# Patient Record
Sex: FEMALE | Race: WHITE | Employment: FULL TIME
[De-identification: names, ages, dates, MRNs, and addresses within clinical notes are randomized per-mention and may not be internally consistent; named-entity substitution may affect disease eponyms.]

---

## 2017-10-22 ENCOUNTER — HEALTH MAINTENANCE LETTER (OUTPATIENT)
Age: 47
End: 2017-10-22

## 2018-02-01 LAB — PAP SMEAR - HIM PATIENT REPORTED: NEGATIVE

## 2018-02-20 ENCOUNTER — TRANSFERRED RECORDS (OUTPATIENT)
Dept: HEALTH INFORMATION MANAGEMENT | Facility: CLINIC | Age: 48
End: 2018-02-20

## 2019-03-19 ENCOUNTER — ANCILLARY PROCEDURE (OUTPATIENT)
Dept: GENERAL RADIOLOGY | Facility: CLINIC | Age: 49
End: 2019-03-19
Attending: PHYSICIAN ASSISTANT
Payer: COMMERCIAL

## 2019-03-19 ENCOUNTER — OFFICE VISIT (OUTPATIENT)
Dept: FAMILY MEDICINE | Facility: CLINIC | Age: 49
End: 2019-03-19
Payer: COMMERCIAL

## 2019-03-19 VITALS
BODY MASS INDEX: 41.38 KG/M2 | WEIGHT: 248.4 LBS | OXYGEN SATURATION: 97 % | HEIGHT: 65 IN | TEMPERATURE: 98.2 F | RESPIRATION RATE: 16 BRPM | SYSTOLIC BLOOD PRESSURE: 138 MMHG | DIASTOLIC BLOOD PRESSURE: 92 MMHG | HEART RATE: 94 BPM

## 2019-03-19 DIAGNOSIS — M77.9 BONE SPUR: ICD-10-CM

## 2019-03-19 DIAGNOSIS — R03.0 ELEVATED BLOOD PRESSURE READING WITHOUT DIAGNOSIS OF HYPERTENSION: ICD-10-CM

## 2019-03-19 DIAGNOSIS — M79.672 LEFT FOOT PAIN: Primary | ICD-10-CM

## 2019-03-19 DIAGNOSIS — M79.672 LEFT FOOT PAIN: ICD-10-CM

## 2019-03-19 PROCEDURE — 99213 OFFICE O/P EST LOW 20 MIN: CPT | Performed by: PHYSICIAN ASSISTANT

## 2019-03-19 PROCEDURE — 73630 X-RAY EXAM OF FOOT: CPT | Mod: LT

## 2019-03-19 ASSESSMENT — MIFFLIN-ST. JEOR: SCORE: 1754.68

## 2019-03-19 NOTE — PROGRESS NOTES
pod  SUBJECTIVE:   Claudia Heaton is a 48 year old female who presents to clinic today for the following health issues:    Musculoskeletal problem/pain    Duration: Saturday     Description  Location: left foot     Intensity:  Moderate to severe    Accompanying signs and symptoms: swelling    History  Previous similar problem: YES  Previous evaluation: xrays, pt has bone spurs      Precipitating or alleviating factors:  Trauma or overuse: no   Aggravating factors include: standing and walking    Therapies tried and outcome: ice and Aleve     -Patient is a 47yo female who presents with left foot pain, increased since Saturday  -She has a hx of bone spurs, arthritis - dx with podiatry/imaging  -at that point she chose conservative management with nsaids, supportive footwear  -for her work she spends a lot of time on her feet and noting the longer she is on her feet the worse symptoms are getting  -prior to this flare there was NO acute injury    Problem list and histories reviewed & adjusted, as indicated.  Additional history: as documented    Patient Active Problem List   Diagnosis     Contraception, device intrauterine     Hashimoto's thyroiditis     Obesity     CARDIOVASCULAR SCREENING; LDL GOAL LESS THAN 160     Past Surgical History:   Procedure Laterality Date     C  DELIVERY ONLY         Social History     Tobacco Use     Smoking status: Never Smoker     Smokeless tobacco: Never Used   Substance Use Topics     Alcohol use: Yes     Comment: Rare     Family History   Problem Relation Age of Onset     Thyroid Disease Father      Breast Cancer Maternal Grandmother      Cancer Maternal Grandmother         Skin     Cancer Maternal Grandfather         Unsure. Prostate?     Cardiovascular Paternal Grandfather            Reviewed and updated as needed this visit by clinical staff       Reviewed and updated as needed this visit by Provider         ROS:  Constitutional, HEENT, cardiovascular, pulmonary, gi and gu  "systems are negative, except as otherwise noted.    OBJECTIVE:     BP (!) 138/92   Pulse 94   Temp 98.2  F (36.8  C) (Tympanic)   Resp 16   Ht 1.638 m (5' 4.5\")   Wt 112.7 kg (248 lb 6.4 oz)   SpO2 97%   BMI 41.98 kg/m    Body mass index is 41.98 kg/m .  GENERAL: healthy, alert and no distress  MS: no erythema, warmth or gross swelling. The left foot shows obvious spurring/mass along the dorsal aspect of the mid foot. She has increased pain with toe flexion and extension. There is ttp over the tmt of the great toe. No obvious other abnormalities    Diagnostic Test Results:  Xray - No acute fracture seen; await radiology     ASSESSMENT/PLAN:   1. Left foot pain  2. Bone spur  Chronic hx of pain with increase since Saturday. I am doubtful for stress fracture, her biggest concern. Still with known complications even 10 years ago and obvious increase in the spurring, will have her evaluated by podiatry  - XR Foot Left G/E 3 Views; Future  - ORTHO  REFERRAL    3. Elevated blood pressure reading without diagnosis of hypertension  Recommend close follow up for this. She believes this is likely temporary 2/2 pain but will monitor      Duane Marquez PA-C  [facility]  "

## 2019-03-20 ENCOUNTER — OFFICE VISIT (OUTPATIENT)
Dept: PODIATRY | Facility: CLINIC | Age: 49
End: 2019-03-20
Payer: COMMERCIAL

## 2019-03-20 VITALS
BODY MASS INDEX: 41.32 KG/M2 | WEIGHT: 248 LBS | HEIGHT: 65 IN | DIASTOLIC BLOOD PRESSURE: 72 MMHG | SYSTOLIC BLOOD PRESSURE: 126 MMHG

## 2019-03-20 DIAGNOSIS — M79.672 LEFT FOOT PAIN: Primary | ICD-10-CM

## 2019-03-20 DIAGNOSIS — M77.52 BONE SPUR OF LEFT FOOT: ICD-10-CM

## 2019-03-20 DIAGNOSIS — E66.01 MORBID OBESITY (H): ICD-10-CM

## 2019-03-20 DIAGNOSIS — M19.072 OSTEOARTHRITIS OF LEFT FOOT, UNSPECIFIED OSTEOARTHRITIS TYPE: ICD-10-CM

## 2019-03-20 PROCEDURE — 99204 OFFICE O/P NEW MOD 45 MIN: CPT | Performed by: PODIATRIST

## 2019-03-20 RX ORDER — DEXAMETHASONE SODIUM PHOSPHATE 4 MG/ML
4 INJECTION, SOLUTION INTRA-ARTICULAR; INTRALESIONAL; INTRAMUSCULAR; INTRAVENOUS; SOFT TISSUE ONCE
Qty: 30 ML | Refills: 0 | Status: SHIPPED | OUTPATIENT
Start: 2019-03-20 | End: 2019-07-18

## 2019-03-20 ASSESSMENT — MIFFLIN-ST. JEOR: SCORE: 1752.86

## 2019-03-20 NOTE — LETTER
3/20/2019         RE: Claudia Heaton  [address]  [facility] [address]        Dear Colleague,    Thank you for referring your patient, Claudia Heaton, to the [facility]. Please see a copy of my visit note below.    PATIENT HISTORY:  Sky Marquez PA-C requested I see this patient for their foot issue.  Claudia Heaton is a 48 year old female who presents to clinic for pain to the left dorsal foot. Has had for years. Was diagnosed with arthritis 10 years ago. Has been managing it conservatively. Last few weeks has been more painful. Fairly active at her job and works out. Pain is 6/10 but can be 10/10 at its worst. Has tried icing, heat, new shoes, inserts, and nsaids. Did have xrays yesterday. Wondering what can be done for it at this time.     Review of Systems:  Patient denies fever, chills, rash, wound, numbness, weakness, heart burn, blood in stool, chest pain with activity, calf pain when walking, shortness of breath with activity, chronic cough, easy bleeding/bruising, swelling of ankles, excessive thirst, fatigue, depression, anxiety.  Patient admits to stiffness, limping at times.     PAST MEDICAL HISTORY: No past medical history on file.     PAST SURGICAL HISTORY:   Past Surgical History:   Procedure Laterality Date     C  DELIVERY ONLY          MEDICATIONS:   Current Outpatient Medications:      SYNTHROID 88 MCG OR TABS, 1 TABLET DAILY, Disp: , Rfl:      VITAMIN D CAPS 80494 U OR, 1 CAPSULE DAILY, Disp: , Rfl:      dicyclomine (BENTYL) 20 MG tablet, Take 1 tablet by mouth 4 times daily as needed. (Patient not taking: Reported on 3/19/2019), Disp: 100 tablet, Rfl: 3     ALLERGIES:  No Known Allergies     SOCIAL HISTORY:   Social History     Socioeconomic History     Marital status: Single     Spouse name: Not on file     Number of children: Not on file     Years of education: Not on file     Highest education level: Not on file   Occupational History     Not on file  "  Social Needs     Financial resource strain: Not on file     Food insecurity:     Worry: Not on file     Inability: Not on file     Transportation needs:     Medical: Not on file     Non-medical: Not on file   Tobacco Use     Smoking status: Never Smoker     Smokeless tobacco: Never Used   Substance and Sexual Activity     Alcohol use: Yes     Comment: Rare     Drug use: No     Sexual activity: Not Currently   Lifestyle     Physical activity:     Days per week: Not on file     Minutes per session: Not on file     Stress: Not on file   Relationships     Social connections:     Talks on phone: Not on file     Gets together: Not on file     Attends [employer] service: Not on file     Active member of club or organization: Not on file     Attends meetings of clubs or organizations: Not on file     Relationship status: Not on file     Intimate partner violence:     Fear of current or ex partner: Not on file     Emotionally abused: Not on file     Physically abused: Not on file     Forced sexual activity: Not on file   Other Topics Concern     Not on file   Social History Narrative     Not on file        FAMILY HISTORY:   Family History   Problem Relation Age of Onset     Thyroid Disease Father      Breast Cancer Maternal Grandmother      Cancer Maternal Grandmother         Skin     Cancer Maternal Grandfather         Unsure. Prostate?     Cardiovascular Paternal Grandfather         EXAM:Vitals:  /72   Ht 1.638 m (5' 4.5\")   Wt 112.5 kg (248 lb)   BMI 41.91 kg/m       General appearance: Patient is alert and fully cooperative with history & exam.  No sign of distress is noted during the visit.     Psychiatric: Affect is pleasant & appropriate.  Patient appears motivated to improve health.     Respiratory: Breathing is regular & unlabored while sitting.     HEENT: Hearing is intact to spoken word.  Speech is clear.  No gross evidence of visual impairment that would impact ambulation.     Dermatologic: Skin is " intact to both lower extremities without significant lesions, rash or abrasion.  No paronychia or evidence of soft tissue infection is noted.     Vascular: DP & PT pulses are intact & regular bilaterally.  No significant edema or varicosities noted.  CFT and skin temperature is normal to both lower extremities.     Neurologic: Lower extremity sensation is intact to light touch.  No evidence of weakness or contracture in the lower extremities.  No evidence of neuropathy.     Musculoskeletal: Patient is ambulatory without assistive device or brace. decrease arch height. Pain with range of motion of midtarsal joint, specifically 1st and 2nd metatarsal cuneiforms.     Radiographs: left foot xray: (non weight bearing) No fractures are seen in the LEFT foot. Mdifoot spurring and degenerative changes. Small plantar calcaneal spur. No erosions.     ASSESSMENT:    Left foot pain  Morbid obesity (H)  Osteoarthritis of left foot, unspecified osteoarthritis type  Bone spur of left foot     PLAN:  Reviewed patient's chart in epic. Reviewed xrays. Talked about arthritis and treatments including orthotics, injections, icing, NSAIDS, bracing, physical therapy, compounding pain cream, or surgical intervention.    Appears to be an old lisfranc type injury. Recommend fusion of the 1st and 2nd metatarsal cuneiform joints. Talked about risks including infection, numbness, continued pain, non union, need for further surgery, blood loss, blood clotting. You will scar. She would be non weight bearing for 6 weeks and then minimal weight bearing in boot for 1 month.     She is not interested in surgery at this time. Will try PT with ionto, topical pain cream, and was given a boot for when foot is very sore. She will call when she would like to proceed with surgery.        Bia Medellin DPM, Podiatry/Foot and Ankle Surgery    Weight management plan: Patient was referred to their PCP to discuss a diet and exercise plan.      Again, thank you  for allowing me to participate in the care of your patient.        Sincerely,        Bia Medellin DPM, Podiatry/Foot and Ankle Surgery

## 2019-03-20 NOTE — PROGRESS NOTES
PATIENT HISTORY:  Sky Marquez PA-C requested I see this patient for their foot issue.  Claudia Heaton is a 48 year old female who presents to clinic for pain to the left dorsal foot. Has had for years. Was diagnosed with arthritis 10 years ago. Has been managing it conservatively. Last few weeks has been more painful. Fairly active at her job and works out. Pain is 6/10 but can be 10/10 at its worst. Has tried icing, heat, new shoes, inserts, and nsaids. Did have xrays yesterday. Wondering what can be done for it at this time.     Review of Systems:  Patient denies fever, chills, rash, wound, numbness, weakness, heart burn, blood in stool, chest pain with activity, calf pain when walking, shortness of breath with activity, chronic cough, easy bleeding/bruising, swelling of ankles, excessive thirst, fatigue, depression, anxiety.  Patient admits to stiffness, limping at times.     PAST MEDICAL HISTORY: No past medical history on file.     PAST SURGICAL HISTORY:   Past Surgical History:   Procedure Laterality Date     C  DELIVERY ONLY          MEDICATIONS:   Current Outpatient Medications:      SYNTHROID 88 MCG OR TABS, 1 TABLET DAILY, Disp: , Rfl:      VITAMIN D CAPS 65854 U OR, 1 CAPSULE DAILY, Disp: , Rfl:      dicyclomine (BENTYL) 20 MG tablet, Take 1 tablet by mouth 4 times daily as needed. (Patient not taking: Reported on 3/19/2019), Disp: 100 tablet, Rfl: 3     ALLERGIES:  No Known Allergies     SOCIAL HISTORY:   Social History     Socioeconomic History     Marital status: Single     Spouse name: Not on file     Number of children: Not on file     Years of education: Not on file     Highest education level: Not on file   Occupational History     Not on file   Social Needs     Financial resource strain: Not on file     Food insecurity:     Worry: Not on file     Inability: Not on file     Transportation needs:     Medical: Not on file     Non-medical: Not on file   Tobacco Use     Smoking status: Never  "Smoker     Smokeless tobacco: Never Used   Substance and Sexual Activity     Alcohol use: Yes     Comment: Rare     Drug use: No     Sexual activity: Not Currently   Lifestyle     Physical activity:     Days per week: Not on file     Minutes per session: Not on file     Stress: Not on file   Relationships     Social connections:     Talks on phone: Not on file     Gets together: Not on file     Attends [employer] service: Not on file     Active member of club or organization: Not on file     Attends meetings of clubs or organizations: Not on file     Relationship status: Not on file     Intimate partner violence:     Fear of current or ex partner: Not on file     Emotionally abused: Not on file     Physically abused: Not on file     Forced sexual activity: Not on file   Other Topics Concern     Not on file   Social History Narrative     Not on file        FAMILY HISTORY:   Family History   Problem Relation Age of Onset     Thyroid Disease Father      Breast Cancer Maternal Grandmother      Cancer Maternal Grandmother         Skin     Cancer Maternal Grandfather         Unsure. Prostate?     Cardiovascular Paternal Grandfather         EXAM:Vitals:  /72   Ht 1.638 m (5' 4.5\")   Wt 112.5 kg (248 lb)   BMI 41.91 kg/m      General appearance: Patient is alert and fully cooperative with history & exam.  No sign of distress is noted during the visit.     Psychiatric: Affect is pleasant & appropriate.  Patient appears motivated to improve health.     Respiratory: Breathing is regular & unlabored while sitting.     HEENT: Hearing is intact to spoken word.  Speech is clear.  No gross evidence of visual impairment that would impact ambulation.     Dermatologic: Skin is intact to both lower extremities without significant lesions, rash or abrasion.  No paronychia or evidence of soft tissue infection is noted.     Vascular: DP & PT pulses are intact & regular bilaterally.  No significant edema or varicosities noted.  CFT " and skin temperature is normal to both lower extremities.     Neurologic: Lower extremity sensation is intact to light touch.  No evidence of weakness or contracture in the lower extremities.  No evidence of neuropathy.     Musculoskeletal: Patient is ambulatory without assistive device or brace. decrease arch height. Pain with range of motion of midtarsal joint, specifically 1st and 2nd metatarsal cuneiforms.     Radiographs: left foot xray: (non weight bearing) No fractures are seen in the LEFT foot. Mdifoot spurring and degenerative changes. Small plantar calcaneal spur. No erosions.     ASSESSMENT:    Left foot pain  Morbid obesity (H)  Osteoarthritis of left foot, unspecified osteoarthritis type  Bone spur of left foot     PLAN:  Reviewed patient's chart in epic. Reviewed xrays. Talked about arthritis and treatments including orthotics, injections, icing, NSAIDS, bracing, physical therapy, compounding pain cream, or surgical intervention.    Appears to be an old lisfranc type injury. Recommend fusion of the 1st and 2nd metatarsal cuneiform joints. Talked about risks including infection, numbness, continued pain, non union, need for further surgery, blood loss, blood clotting. You will scar. She would be non weight bearing for 6 weeks and then minimal weight bearing in boot for 1 month.     She is not interested in surgery at this time. Will try PT with ionto, topical pain cream, and was given a boot for when foot is very sore. She will call when she would like to proceed with surgery.        Bia Medellin DPM, Podiatry/Foot and Ankle Surgery    Weight management plan: Patient was referred to their PCP to discuss a diet and exercise plan.

## 2019-03-20 NOTE — PATIENT INSTRUCTIONS
Thank you for choosing [facility]!    DR. GOMEZ'S CLINIC SCHEDULE  MONDAY AM - [place] TUESDAY - [place]   [address] [address] [address] [address] [place] [address]   [phone] / [phone] / [phone]       WEDNESDAY - [place] FRIDAY AM - [facility]   [address]   [address] [address] [address] [place] [address] [address]   [phone] / [phone]       FRIDAY PM - [place] SCHEDULE SURGERY: [phone]   [address] BILLING QUESTIONS: [phone]   [place] [address] AFTER HOURS: [phone]   [phone] / [phone] APPOINTMENTS: [phone]     Consumer Price Line (CPL) [phone]       OSTEOARTHRITIS OF THE FOOT & ANKLE  Osteoarthritis is a condition characterized by the breakdown and eventual loss of cartilage in one or more joints. Cartilage (the connective tissue found at the end of the bones in the joints) protects and cushions the bones during movement. When cartilage deteriorates or is lost, symptoms develop that can restrict one s ability to easily perform daily activities.  Osteoarthritis is also known as degenerative arthritis, reflecting its nature to develop as part of the aging process. As the most common form of arthritis, osteoarthritis affects millions of Americans. Some people refer to osteoarthritis simply as arthritis, even though there are many different types of arthritis.  Osteoarthritis appears at various joints throughout the body, including the hands, feet, spine, hips, and knees. In the foot, the disease most frequently occurs in the big toe, although it is also often found in the midfoot and ankle.  CAUSES  Osteoarthritis is considered a  wear and tear  disease because the cartilage in the joint wears down with repeated stress and use over time. As the cartilage deteriorates and gets thinner, the bones lose their protective covering and  eventually may rub together, causing pain and inflammation of the joint.  An injury may also lead to osteoarthritis, although it may take months or years after the injury for the condition to develop. For example, osteoarthritis in the big toe is often caused by kicking or jamming the toe, or by dropping something on the toe. Osteoarthritis in the midfoot is often caused by dropping something on it, or by a sprain or fracture. In the ankle, osteoarthritis is usually caused by a fracture and occasionally by a severe sprain.  Sometimes osteoarthritis develops as a result of abnormal foot mechanics such as flat feet or high arches. A flat foot causes less stability in the ligaments (bands of tissue that connect bones), resulting in excessive strain on the joints, which can cause arthritis. A high arch is rigid and lacks mobility, causing a jamming of joints that creates an increased risk of arthritis.  SYMPTOMS  People with osteoarthritis in the foot or ankle experience, in varying degrees, one or more of the following: Pain and stiffness in the joint, swelling in or near the joint, or difficulty walking or bending the joint.   Some patients with osteoarthritis also develop a bone spur (a bony protrusion) at the affected joint. Shoe pressure may cause pain at the site of a bone spur, and in some cases blisters or calluses may form over its surface. Bone spurs can also limit the movement of the joint.    DIAGNOSIS  In diagnosing osteoarthritis, the foot and ankle surgeon will examine the foot thoroughly, looking for swelling in the joint, limited mobility, and pain with movement. In some cases, deformity and/or enlargement (spur) of the joint may be noted. X-rays may be ordered to evaluate the extent of the disease.  NON-SURGICAL TREATMENT  To help relieve symptoms, the surgeon may begin treating osteoarthritis with one or more of the following non-surgical approaches:  Oral medications. Nonsteroidal anti-inflammatory  drugs (NSAIDs), such as ibuprofen, are often helpful in reducing the inflammation and pain. Occasionally a prescription for a steroid medication is needed to adequately reduce symptoms.   Orthotic devices. Custom orthotic devices (shoe inserts) are often prescribed to provide support to improve the foot s mechanics or cushioning to help minimize pain.   Bracing. Bracing, which restricts motion and supports the joint, can reduce pain during walking and help prevent further deformity.   Immobilization. Protecting the foot from movement by wearing a cast or removable cast-boot may be necessary to allow the inflammation to resolve.   Steroid injections. In some cases, steroid injections are applied to the affected joint to deliver anti-inflammatory medication.   Physical therapy. Exercises to strengthen the muscles, especially when the osteoarthritis occurs in the ankle, may give the patient greater stability and help avoid injury that might worsen the condition.   DO I NEED SURGERY?  When osteoarthritis has progressed substantially or failed to improve with non-surgical treatment, surgery may be recommended. In advanced cases, surgery may be the only option. The goal of surgery is to decrease pain and improve function. The foot and ankle surgeon will consider a number of factors when selecting the procedure best suited to the patient s condition and lifestyle.    JOINT FUSION COMPLICATIONS   The intent of joint fusion is deformity correction, stability and relief of arthritic pain or bone spurs. Joints, however, do not always fuse. Research shows a 90% chance of bone healing in a nonsmoker and an 80% chance of bone healing in a smoker. The biggest cause of poor healing is stress on the surgical site. Successful healing will require ten or more weeks of protection. The first six weeks will require strict nonweightbearing with virtually no strain or pressure on the foot (some minor toe fusions allow weight on the foot).  This time frame involves casting and crutches.     External pins might be used. These pins will need to be covered and kept dry until removal in about six weeks. Premature pin loosening or removal could lead to poor bone healing. Infection around the pin is possible and therefore strict attention to protection and cleanliness is critical. Pin site infection will lead to significant complications   Poor healing fusions may require electronic bone stimulation or repeat surgery involving   bone grafting. Bone stimulators are expensive and are only used in situations where   bone healing is proven to be delayed. These are not always covered by insurance.     Joint fusions frequently require bone grafting. Bone might be needed from other parts of   your foot, leg or hip. The bone graft harvest site could present its own problems with   fracture, pain, infection and numbness. An alternative is freeze-dried bone from a bone   bank or synthetic bone material.     Successful healing is largely in the hands of the surgical patient. Any compromise to the   surgical site from cheating, injury, smoking or infection will lead to problems.     POTENTIAL COMPLICATIONS OF FOOT & ANKLE SURGERY  Undergoing a surgical procedure involves a certain amount of risk. Risks of complications vary depending on the complexity of the surgery and how you take care of the surgical area during the healing process. Complications can range from minor infection to death. Some complications are temporary while others will be permanent.  Your surgeon weighs the risk of complications vs the potential benefit of undergoing surgery. You need to consider your tolerance for unexpected problems as you decide whether to undergo surgery.    Foot and Ankle surgery involves cutting skin, bone, ligaments, blood vessels and joints.  These structures heal well but not without consequence. Any break to the skin can lead to infection. A deep infection involves bones  or joints which can be devastating. Deep infection can lead to amputation or could spread to other parts of your body. Most infections are minor and easily treated with oral antibiotics. Infections are often times from bacteria already present on your skin. Proper care of the surgical site is an essential component of avoiding infection. Do not get the bandage wet and take proper care of external pins to avoid these problems.     Joint stiffness is inherent to any foot or ankle surgery. Joint surgery is a major component of reconstructive foot and ankle procedures. The ligaments and tissues around the joint are cut, and later repaired. Scare tissue limits joint mobility. This can be permanent but generally improves over the course of one year.    Surgery involves dissection around nerves. Visible nerves are moved out of the way while very small nerves are cut. Scar tissue develops around nerves and can lead to nerve pain, numbness, or neuromas. Nerve symptoms can be permanent. This can lead to numbness or sometimes hypersensitivity to touch and problems wearing shoes.    Bones do not always heal after surgery. Poor healing after a bone cut or joint fusion can lead to an extended period of casting or repeat surgery. Electronic bone stimulators are sometimes used to stimulate poor healing of bone. Nonunion is when joint fusion does not take.  This can occur as often as 10% of the time. Smoking doubles your risk of poor bone healing to 20%.    Bone grafting is sometimes necessary during the original or subsequent surgery. Bone is sometimes taken from other parts of your body or freeze dried bone from a bone bank from a bone bank or synthetic bone material might be used.    A scar is always present after foot and ankle surgery. The scar will be visible and could be sensitive. Some people develop excessive scarring, which cannot be controlled by the surgeon. Scars can be unsightly and can restrict joint  mobility.    Blood clots can develop in the calf after surgery. Foot and ankle surgery is a predisposing factor for blood clots. The blood clot could break and travel to your lung.  This condition can lead to death. Early warning signs could include calf swelling and pain, chest pain or shortness of breath. This is an emergency that requires immediate attention by a medical doctor!    Surgery will not necessarily create a pain-free foot. Even normal feet hurt. Crooked toes, bunions, neuromas, flat feet and arthritis should all be considered permanent conditions.  Ankle pain commonly requires multiple surgeries over a lifetime. Do not assume that having surgery will permanently fix your condition. You may need permanent alteration in shoes and activities to accommodate your foot and ankle problem.    Careful attention to post-operative recommendations will dramatically reduce your risk of complications. Proper dressing, wound care, elevation and rest will be essential to get the wound healed and minimize scarring. Strict attention to activity restrictions, such as non-weight bearing, or partial weight bearing is essential. Internal fixation devices may not resist the stress of walking. Some select surgeries allow the patient to walk, however this should be very minimal.    Despite these concerns, foot and ankle surgery leads to a high level of patient satisfaction. Your surgeon would not recommend surgery if he/she did not expect your foot to improve. Talk to your surgeon about any of the above issues.      BODY WEIGHT AND YOUR FEET  The following information is included in the after visit summary for all patients. Body weight can be a sensitive issue to discuss in clinic, but we think the following information is very important. Although we focus on the feet and ankles, we do support the overall health of our patients.     Many things can cause foot and ankle problems. Foot structure, activity level, foot mechanics  and injuries are common causes of pain. One very important issue that often goes unmentioned, is body weight. Extra weight can cause increased stress on muscles, ligaments, bones and tendons. Sometimes just a few extra pounds is all it takes to put one over her/his threshold. Without reducing that stress, it can be difficult to alleviate pain. As Foot & Ankle specialists, our job is addressing the lower extremity problem and possible causes. Regarding extra body weight, we encourage patients to discuss diet and weight management plans with their primary care doctors. It is this team approach that gives you the best opportunity for pain relief and getting you back on your feet.      [facility] has a Comprehensive Weight Management Program. This program includes counseling, education, non-surgical and surgical approaches to weight loss. If you are interested in learning more either talk to you primary care provider or call [phone].

## 2019-04-01 ENCOUNTER — THERAPY VISIT (OUTPATIENT)
Dept: PHYSICAL THERAPY | Facility: CLINIC | Age: 49
End: 2019-04-01
Payer: COMMERCIAL

## 2019-04-01 DIAGNOSIS — M19.072 OSTEOARTHRITIS OF LEFT FOOT, UNSPECIFIED OSTEOARTHRITIS TYPE: ICD-10-CM

## 2019-04-01 DIAGNOSIS — E66.01 MORBID OBESITY (H): ICD-10-CM

## 2019-04-01 DIAGNOSIS — M77.52 BONE SPUR OF LEFT FOOT: ICD-10-CM

## 2019-04-01 DIAGNOSIS — M79.672 LEFT FOOT PAIN: ICD-10-CM

## 2019-04-01 PROCEDURE — 97161 PT EVAL LOW COMPLEX 20 MIN: CPT | Mod: GP | Performed by: PHYSICAL THERAPIST

## 2019-04-01 PROCEDURE — 97110 THERAPEUTIC EXERCISES: CPT | Mod: GP | Performed by: PHYSICAL THERAPIST

## 2019-04-01 PROCEDURE — 97033 APP MDLTY 1+IONTPHRSIS EA 15: CPT | Mod: GP | Performed by: PHYSICAL THERAPIST

## 2019-04-01 NOTE — PROGRESS NOTES
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  She goes to [employer] - used to go 4 days/week.  Pt was living with a 3-5/10 pain up until 2 weeks ago.  She was on her feet a lot on concrete for a recruiting event and she was standing a lot.  She put weight through her heel a lot and she moved her weight forward to the front of the foot and got an 11+/10 pain.  The pain didn't settle down much, so she went in to see the doctor.  Pain when she is walking the boot is 2-3/10.      The history is provided by the patient. No  was used.   Claudia Heaton is a 48 year old female with a left foot condition.  Condition occurred with:  Degenerative joint disease.  Condition occurred: other.  This is a chronic condition  Worsened on 3/15/2019.    Site of Pain: middle of dorsum of foot.  Radiates to:  Foot.  Pain is described as sharp and is intermittent and reported as 7/10.  Associated symptoms:  Loss of strength and loss of motion/stiffness. Worse during: weightbearing activities.  Symptoms are exacerbated by weight bearing and relieved by bracing/immobilizing.  Since onset symptoms are gradually worsening.  Special tests:  X-ray (arthritis).      General health as reported by patient is good.  Pertinent medical history includes:  Overweight and thyroid problems (arthritis in foot).  Medical allergies: no.  Other surgeries include:  Other ().  Current medications:  Thyroid medication and pain medication (aleve).  Current occupation is  for [employer].    Primary job tasks include:  Driving, lifting, prolonged standing and prolonged sitting (computer work).    Barriers include:  None as reported by patient.    Red flags:  None as reported by patient.                        Objective:    Gait:  In a boot  Gait Type:  Antalgic                Ankle/Foot Evaluation  ROM:    AROM:    Dorsiflexion:  Left:   12  Right:   20  Plantarflexion:  Left:  48 (PDM)    Right:  60  Inversion:  Left:  24     Right:  15  Eversion:  4 (+++)     Right:  35        Strength:    Dorsiflexion:  Left: 5/5      Pain:+       Inversion:Left: 5/5   Pain:++       Eversion:Left: 5/5   Pain:++                Strength wnl ankle: unable to assess PF strength secondary to pain.                                                              General     ROS    Assessment/Plan:    Patient is a 48 year old female with L foot complaints.    Patient has the following significant findings with corresponding treatment plan.                Diagnosis 1:  L foot pain secondary to arthritis  Pain -  hot/cold therapy  Decreased ROM/flexibility - therapeutic exercise and home program  Inflammation - iontophoresis  Decreased function - therapeutic activities and home program    Cumulative Therapy Evaluation is: Low complexity.    Previous and current functional limitations:  (See Goal Flow Sheet for this information)    Short term and Long term goals: (See Goal Flow Sheet for this information)     Communication ability:  Patient appears to be able to clearly communicate and understand verbal and written communication and follow directions correctly.  Treatment Explanation - The following has been discussed with the patient:   RX ordered/plan of care  Anticipated outcomes  Possible risks and side effects  This patient would benefit from PT intervention to resume normal activities.   Rehab potential is good.    Frequency:  1 X week, once daily  Duration:  for 6 weeks  Discharge Plan:  Achieve all LTG.  Independent in home treatment program.  Reach maximal therapeutic benefit.    Please refer to the daily flowsheet for treatment today, total treatment time and time spent performing 1:1 timed codes.

## 2019-04-03 ENCOUNTER — OFFICE VISIT (OUTPATIENT)
Dept: PODIATRY | Facility: CLINIC | Age: 49
End: 2019-04-03
Payer: COMMERCIAL

## 2019-04-03 VITALS
BODY MASS INDEX: 41.32 KG/M2 | DIASTOLIC BLOOD PRESSURE: 68 MMHG | HEIGHT: 65 IN | WEIGHT: 248 LBS | SYSTOLIC BLOOD PRESSURE: 126 MMHG

## 2019-04-03 DIAGNOSIS — M19.072 OSTEOARTHRITIS OF LEFT FOOT, UNSPECIFIED OSTEOARTHRITIS TYPE: ICD-10-CM

## 2019-04-03 DIAGNOSIS — M79.672 LEFT FOOT PAIN: Primary | ICD-10-CM

## 2019-04-03 PROCEDURE — 99213 OFFICE O/P EST LOW 20 MIN: CPT | Performed by: PODIATRIST

## 2019-04-03 ASSESSMENT — MIFFLIN-ST. JEOR: SCORE: 1752.86

## 2019-04-03 NOTE — LETTER
"    4/3/2019         RE: Claudia Heaton  [address]  [facility] [address]        Dear Colleague,    Thank you for referring your patient, Claudia Heaton, to the [facility]. Please see a copy of my visit note below.    Podiatry / Foot and Ankle Surgery Progress Note    April 3, 2019    Subject: Patient was seen for follow up on left foot pain. Notes the boot has been helpful. Has just had one session of PT.     Objective:  Vitals: /68   Ht 1.638 m (5' 4.5\")   Wt 112.5 kg (248 lb)   BMI 41.91 kg/m     BMI= Body mass index is 41.91 kg/m .     General: NAD    Dermatologic: Skin is intact to both lower extremities without significant lesions, rash or abrasion.  No paronychia or evidence of soft tissue infection is noted.     Vascular: DP & PT pulses are intact & regular bilaterally.  No significant edema or varicosities noted.  CFT and skin temperature is normal to both lower extremities.     Neurologic: Lower extremity sensation is intact to light touch.  No evidence of weakness or contracture in the lower extremities.  No evidence of neuropathy.     Musculoskeletal: Patient is ambulatory without assistive device or brace. decrease arch height. Pain with range of motion of midtarsal joint, specifically 1st and 2nd metatarsal cuneiforms.      Radiographs: left foot xray: (non weight bearing) No fractures are seen in the LEFT foot. Mdifoot spurring and degenerative changes. Small plantar calcaneal spur. No erosions.     ASSESSMENT:    Left foot pain  Morbid obesity (H)  Osteoarthritis of left foot, unspecified osteoarthritis type  Bone spur of left foot     PLAN:  Reviewed patient's chart in epic. Reviewed xrays. Talked about arthritis and treatments including orthotics, injections, icing, NSAIDS, bracing, physical therapy, compounding pain cream, or surgical intervention.     Appears to be an old lisfranc type injury. Recommend fusion of the 1st and 2nd metatarsal cuneiform joints. " Talked about risks including infection, numbness, continued pain, non union, need for further surgery, blood loss, blood clotting. You will scar. She would be non weight bearing for 6 weeks and then minimal weight bearing in boot for 1 month.      She is not interested in surgery at this time. Will try PT with ionto, topical pain cream, and was given a boot for when foot is very sore. She will call when she would like to proceed with surgery.     Bia Medellin DPM, Podiatry/Foot and Ankle Surgery    Weight management plan: Patient was referred to their PCP to discuss a diet and exercise plan.    Recommended to Claudia Heaton to follow up with Primary Care provider regarding elevated blood pressure.      Again, thank you for allowing me to participate in the care of your patient.        Sincerely,        Bia Medellin DPM, Podiatry/Foot and Ankle Surgery

## 2019-04-03 NOTE — PROGRESS NOTES
"Podiatry / Foot and Ankle Surgery Progress Note    April 3, 2019    Subject: Patient was seen for follow up on left foot pain. Notes the boot has been helpful. Has just had one session of PT.     Objective:  Vitals: /68   Ht 1.638 m (5' 4.5\")   Wt 112.5 kg (248 lb)   BMI 41.91 kg/m    BMI= Body mass index is 41.91 kg/m .     General: NAD    Dermatologic: Skin is intact to both lower extremities without significant lesions, rash or abrasion.  No paronychia or evidence of soft tissue infection is noted.     Vascular: DP & PT pulses are intact & regular bilaterally.  No significant edema or varicosities noted.  CFT and skin temperature is normal to both lower extremities.     Neurologic: Lower extremity sensation is intact to light touch.  No evidence of weakness or contracture in the lower extremities.  No evidence of neuropathy.     Musculoskeletal: Patient is ambulatory without assistive device or brace. decrease arch height. Pain with range of motion of midtarsal joint, specifically 1st and 2nd metatarsal cuneiforms.      Radiographs: left foot xray: (non weight bearing) No fractures are seen in the LEFT foot. Mdifoot spurring and degenerative changes. Small plantar calcaneal spur. No erosions.     ASSESSMENT:    Left foot pain  Morbid obesity (H)  Osteoarthritis of left foot, unspecified osteoarthritis type  Bone spur of left foot     PLAN:  Reviewed patient's chart in epic. Reviewed xrays. Talked about arthritis and treatments including orthotics, injections, icing, NSAIDS, bracing, physical therapy, compounding pain cream, or surgical intervention.     Appears to be an old lisfranc type injury. Recommend fusion of the 1st and 2nd metatarsal cuneiform joints. Talked about risks including infection, numbness, continued pain, non union, need for further surgery, blood loss, blood clotting. You will scar. She would be non weight bearing for 6 weeks and then minimal weight bearing in boot for 1 month. "      She is not interested in surgery at this time. Will try PT with ionto, topical pain cream, and was given a boot for when foot is very sore. She will call when she would like to proceed with surgery.     Bia Medellin DPM, Podiatry/Foot and Ankle Surgery    Weight management plan: Patient was referred to their PCP to discuss a diet and exercise plan.    Recommended to Claudia Heaton to follow up with Primary Care provider regarding elevated blood pressure.

## 2019-04-08 ENCOUNTER — THERAPY VISIT (OUTPATIENT)
Dept: PHYSICAL THERAPY | Facility: CLINIC | Age: 49
End: 2019-04-08
Payer: COMMERCIAL

## 2019-04-08 DIAGNOSIS — M79.672 LEFT FOOT PAIN: ICD-10-CM

## 2019-04-08 PROCEDURE — 97110 THERAPEUTIC EXERCISES: CPT | Mod: GP | Performed by: PHYSICAL THERAPIST

## 2019-04-08 PROCEDURE — 97035 APP MDLTY 1+ULTRASOUND EA 15: CPT | Mod: GP | Performed by: PHYSICAL THERAPIST

## 2019-04-16 ENCOUNTER — THERAPY VISIT (OUTPATIENT)
Dept: PHYSICAL THERAPY | Facility: CLINIC | Age: 49
End: 2019-04-16
Payer: COMMERCIAL

## 2019-04-16 DIAGNOSIS — M79.672 LEFT FOOT PAIN: ICD-10-CM

## 2019-04-16 PROCEDURE — 97035 APP MDLTY 1+ULTRASOUND EA 15: CPT | Mod: GP | Performed by: PHYSICAL THERAPIST

## 2019-06-26 ENCOUNTER — OFFICE VISIT (OUTPATIENT)
Dept: PODIATRY | Facility: CLINIC | Age: 49
End: 2019-06-26
Payer: COMMERCIAL

## 2019-06-26 VITALS
WEIGHT: 246 LBS | DIASTOLIC BLOOD PRESSURE: 90 MMHG | HEIGHT: 65 IN | SYSTOLIC BLOOD PRESSURE: 136 MMHG | BODY MASS INDEX: 40.98 KG/M2

## 2019-06-26 DIAGNOSIS — M77.52 BONE SPUR OF LEFT FOOT: ICD-10-CM

## 2019-06-26 DIAGNOSIS — M79.672 LEFT FOOT PAIN: Primary | ICD-10-CM

## 2019-06-26 DIAGNOSIS — M19.072 PRIMARY OSTEOARTHRITIS OF LEFT FOOT: ICD-10-CM

## 2019-06-26 PROCEDURE — 99214 OFFICE O/P EST MOD 30 MIN: CPT | Performed by: PODIATRIST

## 2019-06-26 ASSESSMENT — MIFFLIN-ST. JEOR: SCORE: 1743.79

## 2019-06-26 NOTE — PATIENT INSTRUCTIONS
Please call to schedule your MRI/CT/Ultrasound/Arthrogram appointment.  The number is [phone].        Thank you for choosing [facility]!    DR. GOMEZ'S CLINIC SCHEDULE  MONDAY AM - [place] TUESDAY - [place]   [address] [address] [place] [address]   [phone] / [phone] / [phone]       WEDNESDAY - [place] FRIDAY AM - [facility]   [address]   [place] [address] [place] [address]   [phone] / [phone]       FRIDAY PM - [place] SCHEDULE SURGERY: [phone]   [address] BILLING QUESTIONS: [phone]   [place] [address] AFTER HOURS: [phone] / [phone] APPOINTMENTS: [phone]     [employer] (CPL) [phone]         OSTEOARTHRITIS OF THE FOOT & ANKLE  Osteoarthritis is a condition characterized by the breakdown and eventual loss of cartilage in one or more joints. Cartilage (the connective tissue found at the end of the bones in the joints) protects and cushions the bones during movement. When cartilage deteriorates or is lost, symptoms develop that can restrict one s ability to easily perform daily activities.  Osteoarthritis is also known as degenerative arthritis, reflecting its nature to develop as part of the aging process. As the most common form of arthritis, osteoarthritis affects millions of Americans. Some people refer to osteoarthritis simply as arthritis, even though there are many different types of arthritis.  Osteoarthritis appears at various joints throughout the body, including the hands, feet, spine, hips, and knees. In the foot, the disease most frequently occurs in the big toe, although it is also often found in the midfoot and ankle.  CAUSES  Osteoarthritis is considered a  wear and tear  disease because the cartilage in the joint wears down with repeated stress and use  over time. As the cartilage deteriorates and gets thinner, the bones lose their protective covering and eventually may rub together, causing pain and inflammation of the joint.  An injury may also lead to osteoarthritis, although it may take months or years after the injury for the condition to develop. For example, osteoarthritis in the big toe is often caused by kicking or jamming the toe, or by dropping something on the toe. Osteoarthritis in the midfoot is often caused by dropping something on it, or by a sprain or fracture. In the ankle, osteoarthritis is usually caused by a fracture and occasionally by a severe sprain.  Sometimes osteoarthritis develops as a result of abnormal foot mechanics such as flat feet or high arches. A flat foot causes less stability in the ligaments (bands of tissue that connect bones), resulting in excessive strain on the joints, which can cause arthritis. A high arch is rigid and lacks mobility, causing a jamming of joints that creates an increased risk of arthritis.  SYMPTOMS  People with osteoarthritis in the foot or ankle experience, in varying degrees, one or more of the following: Pain and stiffness in the joint, swelling in or near the joint, or difficulty walking or bending the joint.   Some patients with osteoarthritis also develop a bone spur (a bony protrusion) at the affected joint. Shoe pressure may cause pain at the site of a bone spur, and in some cases blisters or calluses may form over its surface. Bone spurs can also limit the movement of the joint.    DIAGNOSIS  In diagnosing osteoarthritis, the foot and ankle surgeon will examine the foot thoroughly, looking for swelling in the joint, limited mobility, and pain with movement. In some cases, deformity and/or enlargement (spur) of the joint may be noted. X-rays may be ordered to evaluate the extent of the disease.  NON-SURGICAL TREATMENT  To help relieve symptoms, the surgeon may begin treating osteoarthritis with  one or more of the following non-surgical approaches:  Oral medications. Nonsteroidal anti-inflammatory drugs (NSAIDs), such as ibuprofen, are often helpful in reducing the inflammation and pain. Occasionally a prescription for a steroid medication is needed to adequately reduce symptoms.   Orthotic devices. Custom orthotic devices (shoe inserts) are often prescribed to provide support to improve the foot s mechanics or cushioning to help minimize pain.   Bracing. Bracing, which restricts motion and supports the joint, can reduce pain during walking and help prevent further deformity.   Immobilization. Protecting the foot from movement by wearing a cast or removable cast-boot may be necessary to allow the inflammation to resolve.   Steroid injections. In some cases, steroid injections are applied to the affected joint to deliver anti-inflammatory medication.   Physical therapy. Exercises to strengthen the muscles, especially when the osteoarthritis occurs in the ankle, may give the patient greater stability and help avoid injury that might worsen the condition.   DO I NEED SURGERY?  When osteoarthritis has progressed substantially or failed to improve with non-surgical treatment, surgery may be recommended. In advanced cases, surgery may be the only option. The goal of surgery is to decrease pain and improve function. The foot and ankle surgeon will consider a number of factors when selecting the procedure best suited to the patient s condition and lifestyle.    POTENTIAL COMPLICATIONS OF FOOT & ANKLE SURGERY  Undergoing a surgical procedure involves a certain amount of risk. Risks of complications vary depending on the complexity of the surgery and how you take care of the surgical area during the healing process. Complications can range from minor infection to death. Some complications are temporary while others will be permanent.  Your surgeon weighs the risk of complications vs the potential benefit of  undergoing surgery. You need to consider your tolerance for unexpected problems as you decide whether to undergo surgery.    Foot and Ankle surgery involves cutting skin, bone, ligaments, blood vessels and joints.  These structures heal well but not without consequence. Any break to the skin can lead to infection. A deep infection involves bones or joints which can be devastating. Deep infection can lead to amputation or could spread to other parts of your body. Most infections are minor and easily treated with oral antibiotics. Infections are often times from bacteria already present on your skin. Proper care of the surgical site is an essential component of avoiding infection. Do not get the bandage wet and take proper care of external pins to avoid these problems.     Joint stiffness is inherent to any foot or ankle surgery. Joint surgery is a major component of reconstructive foot and ankle procedures. The ligaments and tissues around the joint are cut, and later repaired. Scare tissue limits joint mobility. This can be permanent but generally improves over the course of one year.    Surgery involves dissection around nerves. Visible nerves are moved out of the way while very small nerves are cut. Scar tissue develops around nerves and can lead to nerve pain, numbness, or neuromas. Nerve symptoms can be permanent. This can lead to numbness or sometimes hypersensitivity to touch and problems wearing shoes.    Bones do not always heal after surgery. Poor healing after a bone cut or joint fusion can lead to an extended period of casting or repeat surgery. Electronic bone stimulators are sometimes used to stimulate poor healing of bone. Nonunion is when joint fusion does not take.  This can occur as often as 10% of the time. Smoking doubles your risk of poor bone healing to 20%.    Bone grafting is sometimes necessary during the original or subsequent surgery. Bone is sometimes taken from other parts of your body or  freeze dried bone from a bone bank from a bone bank or synthetic bone material might be used.    A scar is always present after foot and ankle surgery. The scar will be visible and could be sensitive. Some people develop excessive scarring, which cannot be controlled by the surgeon. Scars can be unsightly and can restrict joint mobility.    Blood clots can develop in the calf after surgery. Foot and ankle surgery is a predisposing factor for blood clots. The blood clot could break and travel to your lung.  This condition can lead to death. Early warning signs could include calf swelling and pain, chest pain or shortness of breath. This is an emergency that requires immediate attention by a medical doctor!    Surgery will not necessarily create a pain-free foot. Even normal feet hurt. Crooked toes, bunions, neuromas, flat feet and arthritis should all be considered permanent conditions.  Ankle pain commonly requires multiple surgeries over a lifetime. Do not assume that having surgery will permanently fix your condition. You may need permanent alteration in shoes and activities to accommodate your foot and ankle problem.    Careful attention to post-operative recommendations will dramatically reduce your risk of complications. Proper dressing, wound care, elevation and rest will be essential to get the wound healed and minimize scarring. Strict attention to activity restrictions, such as non-weight bearing, or partial weight bearing is essential. Internal fixation devices may not resist the stress of walking. Some select surgeries allow the patient to walk, however this should be very minimal.    Despite these concerns, foot and ankle surgery leads to a high level of patient satisfaction. Your surgeon would not recommend surgery if he/she did not expect your foot to improve. Talk to your surgeon about any of the above issues.

## 2019-06-26 NOTE — PROGRESS NOTES
"Podiatry / Foot and Ankle Surgery Progress Note    June 26, 2019    Subject: Patient was seen for follow up on left foot pain. Notes that it is getting worse and wants to discuss surgery further.      Objective:  Vitals: /90   Ht 1.638 m (5' 4.5\")   Wt 111.6 kg (246 lb)   BMI 41.57 kg/m    BMI= Body mass index is 41.57 kg/m .    General:  Patient is alert and orientated.  NAD  Dermatologic: Skin is intact to both lower extremities without significant lesions, rash or abrasion.  No paronychia or evidence of soft tissue infection is noted.     Vascular: DP & PT pulses are intact & regular bilaterally.  No significant edema or varicosities noted.  CFT and skin temperature is normal to both lower extremities.     Neurologic: Lower extremity sensation is intact to light touch.  No evidence of weakness or contracture in the lower extremities.  No evidence of neuropathy.     Musculoskeletal: Patient is ambulatory without assistive device or brace. decrease arch height. Pain with range of motion of midtarsal joint, specifically 1st and 2nd metatarsal cuneiforms.      Radiographs: left foot xray: (non weight bearing) No fractures are seen in the LEFT foot. Mdifoot spurring and degenerative changes. Small plantar calcaneal spur. No erosions.     ASSESSMENT:    Left foot pain  Morbid obesity (H)  Osteoarthritis of left foot, unspecified osteoarthritis type  Bone spur of left foot     PLAN:  Reviewed patient's chart in epic. Reviewed xrays. Talked about arthritis and treatments including orthotics, injections, icing, NSAIDS, bracing, physical therapy, compounding pain cream, or surgical intervention.    Talked about risks including infection, numbness, continued pain, non union, need for further surgery, blood loss, blood clotting. You will scar. She would be non weight bearing for 6 weeks and then minimal weight bearing in boot for 1 month.      Reviewed surgery. Recommend CT to fully assess extent of arthritis. She " will call to schedule.     Bia Medellin DPM, Podiatry/Foot and Ankle Surgery    Weight management plan: Patient was referred to their PCP to discuss a diet and exercise plan.    Recommended to Claudia Heaton to follow up with Primary Care provider regarding elevated blood pressure.

## 2019-06-26 NOTE — LETTER
"    6/26/2019         RE: Claudia Heaton  [address]  [facility] [address]        Dear Colleague,    Thank you for referring your patient, Claudia Heaton, to the [facility]. Please see a copy of my visit note below.    Podiatry / Foot and Ankle Surgery Progress Note    June 26, 2019    Subject: Patient was seen for follow up on left foot pain. Notes that it is getting worse and wants to discuss surgery further.      Objective:  Vitals: /90   Ht 1.638 m (5' 4.5\")   Wt 111.6 kg (246 lb)   BMI 41.57 kg/m     BMI= Body mass index is 41.57 kg/m .    General:  Patient is alert and orientated.  NAD  Dermatologic: Skin is intact to both lower extremities without significant lesions, rash or abrasion.  No paronychia or evidence of soft tissue infection is noted.     Vascular: DP & PT pulses are intact & regular bilaterally.  No significant edema or varicosities noted.  CFT and skin temperature is normal to both lower extremities.     Neurologic: Lower extremity sensation is intact to light touch.  No evidence of weakness or contracture in the lower extremities.  No evidence of neuropathy.     Musculoskeletal: Patient is ambulatory without assistive device or brace. decrease arch height. Pain with range of motion of midtarsal joint, specifically 1st and 2nd metatarsal cuneiforms.      Radiographs: left foot xray: (non weight bearing) No fractures are seen in the LEFT foot. Mdifoot spurring and degenerative changes. Small plantar calcaneal spur. No erosions.     ASSESSMENT:    Left foot pain  Morbid obesity (H)  Osteoarthritis of left foot, unspecified osteoarthritis type  Bone spur of left foot     PLAN:  Reviewed patient's chart in epic. Reviewed xrays. Talked about arthritis and treatments including orthotics, injections, icing, NSAIDS, bracing, physical therapy, compounding pain cream, or surgical intervention.    Talked about risks including infection, numbness, continued pain, non " union, need for further surgery, blood loss, blood clotting. You will scar. She would be non weight bearing for 6 weeks and then minimal weight bearing in boot for 1 month.      Reviewed surgery. Recommend CT to fully assess extent of arthritis. She will call to schedule.     Bia Medellin DPM, Podiatry/Foot and Ankle Surgery    Weight management plan: Patient was referred to their PCP to discuss a diet and exercise plan.    Recommended to Claudia Heaton to follow up with Primary Care provider regarding elevated blood pressure.      Again, thank you for allowing me to participate in the care of your patient.        Sincerely,        Bia Medellin DPM, Podiatry/Foot and Ankle Surgery

## 2019-07-01 ENCOUNTER — HOSPITAL ENCOUNTER (OUTPATIENT)
Dept: CT IMAGING | Facility: CLINIC | Age: 49
Discharge: HOME | End: 2019-07-01
Attending: PODIATRIST | Admitting: PODIATRIST
Payer: COMMERCIAL

## 2019-07-01 DIAGNOSIS — M77.52 BONE SPUR OF LEFT FOOT: ICD-10-CM

## 2019-07-01 DIAGNOSIS — M79.672 LEFT FOOT PAIN: ICD-10-CM

## 2019-07-01 DIAGNOSIS — M19.072 PRIMARY OSTEOARTHRITIS OF LEFT FOOT: ICD-10-CM

## 2019-07-01 PROCEDURE — 73700 CT LOWER EXTREMITY W/O DYE: CPT | Mod: LT

## 2019-07-02 ENCOUNTER — TELEPHONE (OUTPATIENT)
Dept: PODIATRY | Facility: CLINIC | Age: 49
End: 2019-07-02

## 2019-07-02 DIAGNOSIS — M79.672 LEFT FOOT PAIN: Primary | ICD-10-CM

## 2019-07-02 DIAGNOSIS — M19.072 PRIMARY OSTEOARTHRITIS OF LEFT FOOT: ICD-10-CM

## 2019-07-02 NOTE — PROGRESS NOTES
Orders placed.     ALAYNA Stone,     Patient called and is ready to schedule left foot surgery. Please place surgery orders.  Thanks.     Jacob

## 2019-07-02 NOTE — TELEPHONE ENCOUNTER
Scheduled surgery.     Type of surgery: left midfoot fusion (CPT 49234)  Location of surgery: [facility]  Date and time of surgery: 7/25/19 @ 0850am  Surgeon: Lacie  Pre-Op Appt Date: 7/18/19  Post-Op Appt Date: 8/7/19   Packet sent out: Yes  Pre-cert/Authorization completed:  Yes  Date: 7/2/19      Jacob Angel, Surgery Scheduler

## 2019-07-17 ENCOUNTER — OFFICE VISIT (OUTPATIENT)
Dept: PODIATRY | Facility: CLINIC | Age: 49
End: 2019-07-17
Payer: COMMERCIAL

## 2019-07-17 VITALS
DIASTOLIC BLOOD PRESSURE: 88 MMHG | WEIGHT: 246 LBS | SYSTOLIC BLOOD PRESSURE: 128 MMHG | BODY MASS INDEX: 40.98 KG/M2 | HEIGHT: 65 IN

## 2019-07-17 DIAGNOSIS — M79.672 LEFT FOOT PAIN: Primary | ICD-10-CM

## 2019-07-17 DIAGNOSIS — M19.072 PRIMARY OSTEOARTHRITIS OF LEFT FOOT: ICD-10-CM

## 2019-07-17 PROCEDURE — 99213 OFFICE O/P EST LOW 20 MIN: CPT | Performed by: PODIATRIST

## 2019-07-17 ASSESSMENT — MIFFLIN-ST. JEOR: SCORE: 1743.79

## 2019-07-17 NOTE — PROGRESS NOTES
"Podiatry / Foot and Ankle Surgery Progress Note    July 17, 2019    Subject: Patient was seen for follow up on left foot pain. Here to discuss CT and upcoming surgery. Patient notes she is going to  her.     Objective:  Vitals: /88   Ht 1.638 m (5' 4.5\")   Wt 111.6 kg (246 lb)   BMI 41.57 kg/m    BMI= Body mass index is 41.57 kg/m .     General:  Patient is alert and orientated.  NAD    Dermatologic: Skin is intact to both lower extremities without significant lesions, rash or abrasion.  No paronychia or evidence of soft tissue infection is noted.     Vascular: DP & PT pulses are intact & regular bilaterally.  No significant edema or varicosities noted.  CFT and skin temperature is normal to both lower extremities.     Neurologic: Lower extremity sensation is intact to light touch.  No evidence of weakness or contracture in the lower extremities.  No evidence of neuropathy.     Musculoskeletal: Patient is ambulatory without assistive device or brace. decrease arch height. Pain with range of motion of midtarsal joint, specifically 1st and 2nd metatarsal cuneiforms.      Radiographs: left foot xray: (non weight bearing) No fractures are seen in the LEFT foot. Mdifoot spurring and degenerative changes. Small plantar calcaneal spur. No erosions.    CT scan: Dermatologic: Skin is intact to both lower extremities without significant lesions, rash or abrasion.  No paronychia or evidence of soft tissue infection is noted.     Vascular: DP & PT pulses are intact & regular bilaterally.  No significant edema or varicosities noted.  CFT and skin temperature is normal to both lower extremities.     Neurologic: Lower extremity sensation is intact to light touch.  No evidence of weakness or contracture in the lower extremities.  No evidence of neuropathy.     Musculoskeletal: Patient is ambulatory without assistive device or brace. decrease arch height. Pain with range of motion of midtarsal joint, specifically 1st " and 2nd metatarsal cuneiforms.      Radiographs: left foot xray: (non weight bearing) No fractures are seen in the LEFT foot. Mdifoot spurring and degenerative changes. Small plantar calcaneal spur. No erosions.     ASSESSMENT:    Left foot pain  Morbid obesity (H)  Osteoarthritis of left foot, unspecified osteoarthritis type  Bone spur of left foot     PLAN:  Reviewed patient's chart in epic. Reviewed xrays. Talked about arthritis and treatments including orthotics, injections, icing, NSAIDS, bracing, physical therapy, compounding pain cream, or surgical intervention.    Talked about risks including infection, numbness, continued pain, non union, need for further surgery, blood loss, blood clotting. You will scar. She would be non weight bearing for 6 weeks and then minimal weight bearing in boot for 1 month.      Reviewed surgery. Recommend CT to fully assess extent of arthritis. She will call to schedule.      ASSESSMENT:    Left foot pain  Morbid obesity (H)  Osteoarthritis of left foot, unspecified osteoarthritis type  Bone spur of left foot     PLAN:  Reviewed patient's chart in epic. Reviewed xrays. Talked about arthritis and treatments including orthotics, injections, icing, NSAIDS, bracing, physical therapy, compounding pain cream, or surgical intervention.      Talked about risks including infection, numbness, continued pain, non union, need for further surgery, blood loss, blood clotting. You will scar. She would be non weight bearing for 6 weeks and then minimal weight bearing in boot for 1 month.      Surgery will involve fusion of the 2nd me cuneiform joint and possible 1st metatarsal cuneiform joint.    Dicussed that we will prescribe oxycodone for pain. Will marvin one weight bearing for 4 weeks then minimal weight bearing for 4-6 weeks.  All questions were answered to patient satsifaction.     Bia Medellin DPM, Podiatry/Foot and Ankle Surgery    Weight management plan: Patient was referred to their  PCP to discuss a diet and exercise plan.    Recommended to Claudia Heaton to follow up with Primary Care provider regarding elevated blood pressure.

## 2019-07-17 NOTE — LETTER
"    7/17/2019         RE: Claudia Heaton  [address]  [facility] [address]        Dear Colleague,    Thank you for referring your patient, Claudia Heaton, to the [facility]. Please see a copy of my visit note below.    Podiatry / Foot and Ankle Surgery Progress Note    July 17, 2019    Subject: Patient was seen for follow up on left foot pain. Here to discuss CT and upcoming surgery. Patient notes she is going to  her.     Objective:  Vitals: /88   Ht 1.638 m (5' 4.5\")   Wt 111.6 kg (246 lb)   BMI 41.57 kg/m     BMI= Body mass index is 41.57 kg/m .     General:  Patient is alert and orientated.  NAD    Dermatologic: Skin is intact to both lower extremities without significant lesions, rash or abrasion.  No paronychia or evidence of soft tissue infection is noted.     Vascular: DP & PT pulses are intact & regular bilaterally.  No significant edema or varicosities noted.  CFT and skin temperature is normal to both lower extremities.     Neurologic: Lower extremity sensation is intact to light touch.  No evidence of weakness or contracture in the lower extremities.  No evidence of neuropathy.     Musculoskeletal: Patient is ambulatory without assistive device or brace. decrease arch height. Pain with range of motion of midtarsal joint, specifically 1st and 2nd metatarsal cuneiforms.      Radiographs: left foot xray: (non weight bearing) No fractures are seen in the LEFT foot. Mdifoot spurring and degenerative changes. Small plantar calcaneal spur. No erosions.    CT scan: Dermatologic: Skin is intact to both lower extremities without significant lesions, rash or abrasion.  No paronychia or evidence of soft tissue infection is noted.     Vascular: DP & PT pulses are intact & regular bilaterally.  No significant edema or varicosities noted.  CFT and skin temperature is normal to both lower extremities.     Neurologic: Lower extremity sensation is intact to light touch.  No " evidence of weakness or contracture in the lower extremities.  No evidence of neuropathy.     Musculoskeletal: Patient is ambulatory without assistive device or brace. decrease arch height. Pain with range of motion of midtarsal joint, specifically 1st and 2nd metatarsal cuneiforms.      Radiographs: left foot xray: (non weight bearing) No fractures are seen in the LEFT foot. Mdifoot spurring and degenerative changes. Small plantar calcaneal spur. No erosions.     ASSESSMENT:    Left foot pain  Morbid obesity (H)  Osteoarthritis of left foot, unspecified osteoarthritis type  Bone spur of left foot     PLAN:  Reviewed patient's chart in epic. Reviewed xrays. Talked about arthritis and treatments including orthotics, injections, icing, NSAIDS, bracing, physical therapy, compounding pain cream, or surgical intervention.    Talked about risks including infection, numbness, continued pain, non union, need for further surgery, blood loss, blood clotting. You will scar. She would be non weight bearing for 6 weeks and then minimal weight bearing in boot for 1 month.      Reviewed surgery. Recommend CT to fully assess extent of arthritis. She will call to schedule.      ASSESSMENT:    Left foot pain  Morbid obesity (H)  Osteoarthritis of left foot, unspecified osteoarthritis type  Bone spur of left foot     PLAN:  Reviewed patient's chart in epic. Reviewed xrays. Talked about arthritis and treatments including orthotics, injections, icing, NSAIDS, bracing, physical therapy, compounding pain cream, or surgical intervention.      Talked about risks including infection, numbness, continued pain, non union, need for further surgery, blood loss, blood clotting. You will scar. She would be non weight bearing for 6 weeks and then minimal weight bearing in boot for 1 month.      Surgery will involve fusion of the 2nd me cuneiform joint and possible 1st metatarsal cuneiform joint.    Dicussed that we will prescribe oxycodone for  pain. Will marvin one weight bearing for 4 weeks then minimal weight bearing for 4-6 weeks.  All questions were answered to patient satsifaction.     Bia Medellin DPM, Podiatry/Foot and Ankle Surgery    Weight management plan: Patient was referred to their PCP to discuss a diet and exercise plan.    Recommended to Claudia Heaton to follow up with Primary Care provider regarding elevated blood pressure.      Again, thank you for allowing me to participate in the care of your patient.        Sincerely,        Bia Medellin DPM, Podiatry/Foot and Ankle Surgery

## 2019-07-17 NOTE — PATIENT INSTRUCTIONS
Thank you for choosing [facility]!    DR. GOMEZ'S CLINIC SCHEDULE  MONDAY AM - [place] TUESDAY - [place]   [address] [address] [address] [address] [place] [address]   [phone] / [phone] / [phone]       WEDNESDAY - [place] FRIDAY AM - WOUND CENTER   [address]   [place] [address] [address] [address]   [phone] / [phone]       FRIDAY PM - [place] SCHEDULE SURGERY: [phone]   [address] BILLING QUESTIONS: [phone]   [place] [address] AFTER HOURS: [phone]   [phone] / [phone] APPOINTMENTS: [phone]     Consumer Price Line (CPL) [phone]       JOINT FUSION COMPLICATIONS   The intent of joint fusion is deformity correction, stability and relief of arthritic pain or bone spurs. Joints, however, do not always fuse. Research shows a 90% chance of bone healing in a nonsmoker and an 80% chance of bone healing in a smoker. The biggest cause of poor healing is stress on the surgical site. Successful healing will require ten or more weeks of protection. The first six weeks will require strict nonweightbearing with virtually no strain or pressure on the foot (some minor toe fusions allow weight on the foot). This time frame involves casting and crutches.     External pins might be used. These pins will need to be covered and kept dry until removal in about six weeks. Premature pin loosening or removal could lead to poor bone healing. Infection around the pin is possible and therefore strict attention to protection and cleanliness is critical. Pin site infection will lead to significant complications   Poor healing fusions may require electronic bone stimulation or repeat surgery involving   bone grafting. Bone stimulators are expensive and are only used in situations where   bone healing is proven to be delayed. These are not always  covered by insurance.     Joint fusions frequently require bone grafting. Bone might be needed from other parts of   your foot, leg or hip. The bone graft harvest site could present its own problems with   fracture, pain, infection and numbness. An alternative is freeze-dried bone from a bone   bank or synthetic bone material.     Successful healing is largely in the hands of the surgical patient. Any compromise to the   surgical site from cheating, injury, smoking or infection will lead to problems.       POTENTIAL COMPLICATIONS OF FOOT & ANKLE SURGERY  Undergoing a surgical procedure involves a certain amount of risk. Risks of complications vary depending on the complexity of the surgery and how you take care of the surgical area during the healing process. Complications can range from minor infection to death. Some complications are temporary while others will be permanent.  Your surgeon weighs the risk of complications vs the potential benefit of undergoing surgery. You need to consider your tolerance for unexpected problems as you decide whether to undergo surgery.    Foot and Ankle surgery involves cutting skin, bone, ligaments, blood vessels and joints.  These structures heal well but not without consequence. Any break to the skin can lead to infection. A deep infection involves bones or joints which can be devastating. Deep infection can lead to amputation or could spread to other parts of your body. Most infections are minor and easily treated with oral antibiotics. Infections are often times from bacteria already present on your skin. Proper care of the surgical site is an essential component of avoiding infection. Do not get the bandage wet and take proper care of external pins to avoid these problems.     Joint stiffness is inherent to any foot or ankle surgery. Joint surgery is a major component of reconstructive foot and ankle procedures. The ligaments and tissues around the joint are cut, and later  repaired. Scare tissue limits joint mobility. This can be permanent but generally improves over the course of one year.    Surgery involves dissection around nerves. Visible nerves are moved out of the way while very small nerves are cut. Scar tissue develops around nerves and can lead to nerve pain, numbness, or neuromas. Nerve symptoms can be permanent. This can lead to numbness or sometimes hypersensitivity to touch and problems wearing shoes.    Bones do not always heal after surgery. Poor healing after a bone cut or joint fusion can lead to an extended period of casting or repeat surgery. Electronic bone stimulators are sometimes used to stimulate poor healing of bone. Nonunion is when joint fusion does not take.  This can occur as often as 10% of the time. Smoking doubles your risk of poor bone healing to 20%.    Bone grafting is sometimes necessary during the original or subsequent surgery. Bone is sometimes taken from other parts of your body or freeze dried bone from a bone bank from a bone bank or synthetic bone material might be used.    A scar is always present after foot and ankle surgery. The scar will be visible and could be sensitive. Some people develop excessive scarring, which cannot be controlled by the surgeon. Scars can be unsightly and can restrict joint mobility.    Blood clots can develop in the calf after surgery. Foot and ankle surgery is a predisposing factor for blood clots. The blood clot could break and travel to your lung.  This condition can lead to death. Early warning signs could include calf swelling and pain, chest pain or shortness of breath. This is an emergency that requires immediate attention by a medical doctor!    Surgery will not necessarily create a pain-free foot. Even normal feet hurt. Crooked toes, bunions, neuromas, flat feet and arthritis should all be considered permanent conditions.  Ankle pain commonly requires multiple surgeries over a lifetime. Do not assume  that having surgery will permanently fix your condition. You may need permanent alteration in shoes and activities to accommodate your foot and ankle problem.    Careful attention to post-operative recommendations will dramatically reduce your risk of complications. Proper dressing, wound care, elevation and rest will be essential to get the wound healed and minimize scarring. Strict attention to activity restrictions, such as non-weight bearing, or partial weight bearing is essential. Internal fixation devices may not resist the stress of walking. Some select surgeries allow the patient to walk, however this should be very minimal.    Despite these concerns, foot and ankle surgery leads to a high level of patient satisfaction. Your surgeon would not recommend surgery if he/she did not expect your foot to improve. Talk to your surgeon about any of the above issues.      BODY WEIGHT AND YOUR FEET  The following information is included in the after visit summary for all patients. Body weight can be a sensitive issue to discuss in clinic, but we think the following information is very important. Although we focus on the feet and ankles, we do support the overall health of our patients.     Many things can cause foot and ankle problems. Foot structure, activity level, foot mechanics and injuries are common causes of pain. One very important issue that often goes unmentioned, is body weight. Extra weight can cause increased stress on muscles, ligaments, bones and tendons. Sometimes just a few extra pounds is all it takes to put one over her/his threshold. Without reducing that stress, it can be difficult to alleviate pain. As Foot & Ankle specialists, our job is addressing the lower extremity problem and possible causes. Regarding extra body weight, we encourage patients to discuss diet and weight management plans with their primary care doctors. It is this team approach that gives you the best opportunity for pain  relief and getting you back on your feet.      [facility] has a Comprehensive Weight Management Program. This program includes counseling, education, non-surgical and surgical approaches to weight loss. If you are interested in learning more either talk to you primary care provider or call [phone].

## 2019-07-18 ENCOUNTER — OFFICE VISIT (OUTPATIENT)
Dept: FAMILY MEDICINE | Facility: CLINIC | Age: 49
End: 2019-07-18
Payer: COMMERCIAL

## 2019-07-18 VITALS
TEMPERATURE: 97.4 F | WEIGHT: 248.4 LBS | HEART RATE: 79 BPM | SYSTOLIC BLOOD PRESSURE: 120 MMHG | BODY MASS INDEX: 41.98 KG/M2 | DIASTOLIC BLOOD PRESSURE: 80 MMHG | RESPIRATION RATE: 16 BRPM | OXYGEN SATURATION: 97 %

## 2019-07-18 DIAGNOSIS — E06.3 HASHIMOTO'S THYROIDITIS: ICD-10-CM

## 2019-07-18 DIAGNOSIS — Z01.818 PREOP GENERAL PHYSICAL EXAM: Primary | ICD-10-CM

## 2019-07-18 DIAGNOSIS — M79.672 LEFT FOOT PAIN: ICD-10-CM

## 2019-07-18 LAB — HGB BLD-MCNC: 13.5 G/DL (ref 11.7–15.7)

## 2019-07-18 PROCEDURE — 36415 COLL VENOUS BLD VENIPUNCTURE: CPT | Performed by: NURSE PRACTITIONER

## 2019-07-18 PROCEDURE — 99214 OFFICE O/P EST MOD 30 MIN: CPT | Performed by: NURSE PRACTITIONER

## 2019-07-18 PROCEDURE — 85018 HEMOGLOBIN: CPT | Performed by: NURSE PRACTITIONER

## 2019-07-18 NOTE — LETTER
July 18, 2019      Claudia Heaton  [address]   [place] [address]          Hi Claudia,     Here is a copy of your recent lab.  Your hemoglobin is within normal range.   I hope recovery goes smoothly.   Please let me know if you have any questions or concerns.     Resulted Orders   Hemoglobin   Result Value Ref Range    Hemoglobin 13.5 11.7 - 15.7 g/dL       If you have any questions or concerns, please call the clinic at the number listed above.       Sincerely,        DOMINICK Amos Ra CNP

## 2019-07-18 NOTE — PROGRESS NOTES
[facility]  [address] [facility] [address] [phone]  Dept: [phone]    PRE-OP EVALUATION:  Today's date: 2019    Claudia Heaton (: 1970) presents for pre-operative evaluation assessment as requested by Dr. Medellin.  She requires evaluation and anesthesia risk assessment prior to undergoing surgery/procedure for treatment of Combined General with Popliteal Block .    Fax number for surgical facility: [facility]   Primary Physician: Ezekiel Weaver  Type of Anesthesia Anticipated: General and Popliteal block    Patient has a Health Care Directive or Living Will:  NO    Preop Questions 2019   Who is doing your surgery? Dr. Medellin   What are you having done? foot surgery (left foot)   Date of Surgery/Procedure: 2019   Facility or Hospital where procedure/surgery will be performed: [facility]   1.  Do you have a history of Heart attack, stroke, stent, coronary bypass surgery, or other heart surgery? No   2.  Do you ever have any pain or discomfort in your chest? No   3.  Do you have a history of  Heart Failure? No   4.   Are you troubled by shortness of breath when:  walking on a level surface, or up a slight hill, or at night? No   5.  Do you currently have a cold, bronchitis or other respiratory infection? No   6.  Do you have a cough, shortness of breath, or wheezing? No   7.  Do you sometimes get pains in the calves of your legs when you walk? No   8. Do you or anyone in your family have previous history of blood clots? No   9.  Do you or does anyone in your family have a serious bleeding problem such as prolonged bleeding following surgeries or cuts? No   10. Have you ever had problems with anemia or been told to take iron pills? No   11. Have you had any abnormal blood loss such as black, tarry or bloody stools, or abnormal vaginal bleeding? No   12. Have you ever had a blood transfusion? No   13. Have you or any of your relatives ever  had problems with anesthesia? No   14. Do you have sleep apnea, excessive snoring or daytime drowsiness? UNKNOWN    15. Do you have any prosthetic heart valves? No   16. Do you have prosthetic joints? No   17. Is there any chance that you may be pregnant? No         HPI:     HPI related to upcoming procedure: Chronic L foot pain.  Surgery planned.      hashimotos  Previously stable on levothyroxine prescribed through [facility], now no longer going to the clinic and without med for about 3 months.  Notes some fatigue and more difficulty losing weight without the medication.    MEDICAL HISTORY:     Patient Active Problem List    Diagnosis Date Noted     Left foot pain 2019     Priority: Medium     Morbid obesity (H) 2019     Priority: Medium     CARDIOVASCULAR SCREENING; LDL GOAL LESS THAN 160 10/31/2010     Priority: Medium     Contraception, device intrauterine 2010     Priority: Medium     Hashimoto's thyroiditis 2010     Priority: Medium     Obesity 2010     Priority: Medium      History reviewed. No pertinent past medical history.  Past Surgical History:   Procedure Laterality Date     C  DELIVERY ONLY       Current Outpatient Medications   Medication Sig Dispense Refill     order for DME Equipment being ordered: knee roller.  Will need for 4 months. Post op left foot surgery. Non weight bearing 1 Device 0     order for DME Equipment being ordered: tall cast boot 1 Device 0     SYNTHROID 88 MCG OR TABS 1 TABLET DAILY       OTC products: NSAIDS, last dose 1 week ago    No Known Allergies   Latex Allergy: NO    Social History     Tobacco Use     Smoking status: Former Smoker     Last attempt to quit: 2003     Years since quittin.0     Smokeless tobacco: Never Used   Substance Use Topics     Alcohol use: Yes     Comment: Rare     History   Drug Use No       REVIEW OF SYSTEMS:   CONSTITUTIONAL: NEGATIVE for fever, chills, change in weight  EYES: NEGATIVE for vision changes  or irritation  ENT/MOUTH: NEGATIVE for ear, mouth and throat problems  RESP: NEGATIVE for significant cough or SOB  CV: NEGATIVE for chest pain, palpitations or peripheral edema  GI: NEGATIVE for nausea, abdominal pain, heartburn, or change in bowel habits  : NEGATIVE for frequency, dysuria, or hematuria  MUSCULOSKELETAL:POSITIVE for L foot pain  NEURO: NEGATIVE for weakness, dizziness or paresthesias  ENDOCRINE: NEGATIVE for temperature intolerance, skin/hair changes  HEME: NEGATIVE for bleeding problems  PSYCHIATRIC: NEGATIVE for changes in mood or affect    EXAM:   /80 (BP Location: Right arm, Patient Position: Chair, Cuff Size: Adult Large)   Pulse 79   Temp 97.4  F (36.3  C) (Tympanic)   Resp 16   Wt 112.7 kg (248 lb 6.4 oz)   LMP 07/07/2019 (Approximate)   SpO2 97%   BMI 41.98 kg/m      GENERAL APPEARANCE: healthy, alert and no distress     EYES: Eyes grossly normal to inspection     HENT: ear canals and TM's normal and nose and mouth without ulcers or lesions     NECK: no adenopathy, no asymmetry, masses, or scars and thyroid normal to palpation     RESP: lungs clear to auscultation - no rales, rhonchi or wheezes     CV: regular rates and rhythm, normal S1 S2, no S3 or S4 and no murmur, click or rub     ABDOMEN:  soft, nontender, no HSM or masses and bowel sounds normal     NEURO: Normal strength and tone, sensory exam grossly normal, mentation intact and speech normal     PSYCH: mentation appears normal. and affect normal/bright     LYMPHATICS: No cervical adenopathy    DIAGNOSTICS:   Hemoglobin (indicated for history of anemia or procedure with significant blood loss such as tonsillectomy, major intraperitoneal surgery, vascular surgery, major spine surgery, total joint replacement)    Hemoglobin   Date Value Ref Range Status   07/18/2019 13.5 11.7 - 15.7 g/dL Final       IMPRESSION:   Reason for surgery/procedure: chronic L foot pain  Diagnosis/reason for consult: preop    The proposed  surgical procedure is considered INTERMEDIATE risk.    REVISED CARDIAC RISK INDEX  The patient has the following serious cardiovascular risks for perioperative complications such as (MI, PE, VFib and 3  AV Block):  No serious cardiac risks  INTERPRETATION: 0 risks: Class I (very low risk - 0.4% complication rate)  Pt exercises without cardiac symptoms.    The patient has the following additional risks for perioperative complications:  No identified additional risks      ICD-10-CM    1. Preop general physical exam Z01.818 Hemoglobin   2. Left foot pain M79.672    3. Hashimoto's thyroiditis E06.3 TSH with free T4 reflex       RECOMMENDATIONS:     --Patient is to take all scheduled medications on the day of surgery EXCEPT for modifications listed below.    APPROVAL GIVEN to proceed with proposed procedure, without further diagnostic evaluation    Restart levothyroxine now, recheck blood work in 8 weeks.  Waiting for pt to return call to let us know where this was previously filled so we can refill appropriate dose.       Signed Electronically by: DOMINICK Amos Ra CNP    Copy of this evaluation report is provided to requesting physician.    [facility] Preop Guidelines    Revised Cardiac Risk Index

## 2019-07-24 ENCOUNTER — TELEPHONE (OUTPATIENT)
Dept: PODIATRY | Facility: CLINIC | Age: 49
End: 2019-07-24

## 2019-07-24 NOTE — TELEPHONE ENCOUNTER
It should be okay because we use titanium screws instead of surgical steel.     Please let patient know.     Bia Medellin DPM   Pt is probably nauseated because he is severely constipated. If I am not mistaken, he has not had a BM for a week. See if he is passing gas and if is abdomen is soft or distended. See how much he has been eating.

## 2019-07-24 NOTE — TELEPHONE ENCOUNTER
Left message for patient regarding nickel allergy and type of metal used in surgery.     Closing encounter.       Jacob Angel, Surgery Scheduler

## 2019-07-24 NOTE — TELEPHONE ENCOUNTER
Patient stated that she is unsure if she is allergic to nickel.  She has worn low quality earrings that contained nickel, which irritated her earlobe.  Should that be a concern for a nickel allergy?     Please advise.       Jacob Angle, Surgery Scheduler

## 2019-07-25 ENCOUNTER — HOSPITAL ENCOUNTER (OUTPATIENT)
Facility: CLINIC | Age: 49
Discharge: HOME | End: 2019-07-25
Attending: PODIATRIST | Admitting: PODIATRIST
Payer: COMMERCIAL

## 2019-07-25 ENCOUNTER — APPOINTMENT (OUTPATIENT)
Dept: GENERAL RADIOLOGY | Facility: CLINIC | Age: 49
End: 2019-07-25
Attending: PODIATRIST
Payer: COMMERCIAL

## 2019-07-25 ENCOUNTER — ANESTHESIA (OUTPATIENT)
Dept: SURGERY | Facility: CLINIC | Age: 49
End: 2019-07-25
Payer: COMMERCIAL

## 2019-07-25 ENCOUNTER — ANESTHESIA EVENT (OUTPATIENT)
Dept: SURGERY | Facility: CLINIC | Age: 49
End: 2019-07-25
Payer: COMMERCIAL

## 2019-07-25 VITALS
BODY MASS INDEX: 42.51 KG/M2 | DIASTOLIC BLOOD PRESSURE: 75 MMHG | OXYGEN SATURATION: 95 % | TEMPERATURE: 97.8 F | HEART RATE: 70 BPM | WEIGHT: 249 LBS | HEIGHT: 64 IN | RESPIRATION RATE: 12 BRPM | SYSTOLIC BLOOD PRESSURE: 125 MMHG

## 2019-07-25 DIAGNOSIS — Z98.890 POST-OPERATIVE STATE: Primary | ICD-10-CM

## 2019-07-25 DIAGNOSIS — M79.672 LEFT FOOT PAIN: ICD-10-CM

## 2019-07-25 DIAGNOSIS — M19.072 PRIMARY OSTEOARTHRITIS OF LEFT FOOT: ICD-10-CM

## 2019-07-25 LAB
HCG UR QL: NEGATIVE
INTERPRETATION ECG - MUSE: NORMAL

## 2019-07-25 PROCEDURE — 40000278 XR SURGERY CARM FLUORO LESS THAN 5 MIN: Mod: TC

## 2019-07-25 PROCEDURE — 71000027 ZZH RECOVERY PHASE 2 EACH 15 MINS: Performed by: PODIATRIST

## 2019-07-25 PROCEDURE — 93010 ELECTROCARDIOGRAM REPORT: CPT | Performed by: INTERNAL MEDICINE

## 2019-07-25 PROCEDURE — 25000128 H RX IP 250 OP 636: Performed by: ANESTHESIOLOGY

## 2019-07-25 PROCEDURE — 40000986 XR FOOT PORT LT 2 VW: Mod: LT

## 2019-07-25 PROCEDURE — 88311 DECALCIFY TISSUE: CPT | Performed by: PODIATRIST

## 2019-07-25 PROCEDURE — 36000060 ZZH SURGERY LEVEL 3 W FLUORO 1ST 30 MIN: Performed by: PODIATRIST

## 2019-07-25 PROCEDURE — 37000008 ZZH ANESTHESIA TECHNICAL FEE, 1ST 30 MIN: Performed by: PODIATRIST

## 2019-07-25 PROCEDURE — 71000012 ZZH RECOVERY PHASE 1 LEVEL 1 FIRST HR: Performed by: PODIATRIST

## 2019-07-25 PROCEDURE — 37000009 ZZH ANESTHESIA TECHNICAL FEE, EACH ADDTL 15 MIN: Performed by: PODIATRIST

## 2019-07-25 PROCEDURE — 36000058 ZZH SURGERY LEVEL 3 EA 15 ADDTL MIN: Performed by: PODIATRIST

## 2019-07-25 PROCEDURE — 25000128 H RX IP 250 OP 636: Performed by: PODIATRIST

## 2019-07-25 PROCEDURE — 81025 URINE PREGNANCY TEST: CPT | Performed by: ANESTHESIOLOGY

## 2019-07-25 PROCEDURE — 88304 TISSUE EXAM BY PATHOLOGIST: CPT | Mod: 26 | Performed by: PODIATRIST

## 2019-07-25 PROCEDURE — C1713 ANCHOR/SCREW BN/BN,TIS/BN: HCPCS | Performed by: PODIATRIST

## 2019-07-25 PROCEDURE — 88304 TISSUE EXAM BY PATHOLOGIST: CPT | Performed by: PODIATRIST

## 2019-07-25 PROCEDURE — 25000125 ZZHC RX 250: Performed by: PODIATRIST

## 2019-07-25 PROCEDURE — 40000306 ZZH STATISTIC PRE PROC ASSESS II: Performed by: PODIATRIST

## 2019-07-25 PROCEDURE — 25800030 ZZH RX IP 258 OP 636: Performed by: ANESTHESIOLOGY

## 2019-07-25 PROCEDURE — C1769 GUIDE WIRE: HCPCS | Performed by: PODIATRIST

## 2019-07-25 PROCEDURE — 25000125 ZZHC RX 250: Performed by: NURSE ANESTHETIST, CERTIFIED REGISTERED

## 2019-07-25 PROCEDURE — 88311 DECALCIFY TISSUE: CPT | Mod: 26 | Performed by: PODIATRIST

## 2019-07-25 PROCEDURE — 27210794 ZZH OR GENERAL SUPPLY STERILE: Performed by: PODIATRIST

## 2019-07-25 PROCEDURE — 28740 FUSION OF FOOT BONES: CPT | Mod: LT | Performed by: PODIATRIST

## 2019-07-25 PROCEDURE — 71000013 ZZH RECOVERY PHASE 1 LEVEL 1 EA ADDTL HR: Performed by: PODIATRIST

## 2019-07-25 PROCEDURE — 25000128 H RX IP 250 OP 636: Performed by: NURSE ANESTHETIST, CERTIFIED REGISTERED

## 2019-07-25 PROCEDURE — 25000125 ZZHC RX 250: Performed by: ANESTHESIOLOGY

## 2019-07-25 DEVICE — IMP SCR ARTHREX QUICKFIX CANC PT 3.0X26MM TI AR-8730-26PT: Type: IMPLANTABLE DEVICE | Site: FOOT | Status: FUNCTIONAL

## 2019-07-25 RX ORDER — BUPIVACAINE HYDROCHLORIDE AND EPINEPHRINE 5; 5 MG/ML; UG/ML
INJECTION, SOLUTION PERINEURAL PRN
Status: DISCONTINUED | OUTPATIENT
Start: 2019-07-25 | End: 2019-07-25

## 2019-07-25 RX ORDER — CEFAZOLIN SODIUM 1 G/3ML
1 INJECTION, POWDER, FOR SOLUTION INTRAMUSCULAR; INTRAVENOUS SEE ADMIN INSTRUCTIONS
Status: DISCONTINUED | OUTPATIENT
Start: 2019-07-25 | End: 2019-07-25 | Stop reason: HOSPADM

## 2019-07-25 RX ORDER — KETOROLAC TROMETHAMINE 30 MG/ML
INJECTION, SOLUTION INTRAMUSCULAR; INTRAVENOUS PRN
Status: DISCONTINUED | OUTPATIENT
Start: 2019-07-25 | End: 2019-07-25

## 2019-07-25 RX ORDER — GLYCOPYRROLATE 0.2 MG/ML
INJECTION, SOLUTION INTRAMUSCULAR; INTRAVENOUS PRN
Status: DISCONTINUED | OUTPATIENT
Start: 2019-07-25 | End: 2019-07-25

## 2019-07-25 RX ORDER — BUPIVACAINE HYDROCHLORIDE 5 MG/ML
INJECTION, SOLUTION EPIDURAL; INTRACAUDAL PRN
Status: DISCONTINUED | OUTPATIENT
Start: 2019-07-25 | End: 2019-07-25 | Stop reason: HOSPADM

## 2019-07-25 RX ORDER — NEOSTIGMINE METHYLSULFATE 1 MG/ML
VIAL (ML) INJECTION PRN
Status: DISCONTINUED | OUTPATIENT
Start: 2019-07-25 | End: 2019-07-25

## 2019-07-25 RX ORDER — PROPOFOL 10 MG/ML
INJECTION, EMULSION INTRAVENOUS CONTINUOUS PRN
Status: DISCONTINUED | OUTPATIENT
Start: 2019-07-25 | End: 2019-07-25

## 2019-07-25 RX ORDER — MAGNESIUM HYDROXIDE 1200 MG/15ML
LIQUID ORAL PRN
Status: DISCONTINUED | OUTPATIENT
Start: 2019-07-25 | End: 2019-07-25 | Stop reason: HOSPADM

## 2019-07-25 RX ORDER — PROPOFOL 10 MG/ML
INJECTION, EMULSION INTRAVENOUS PRN
Status: DISCONTINUED | OUTPATIENT
Start: 2019-07-25 | End: 2019-07-25

## 2019-07-25 RX ORDER — ONDANSETRON 4 MG/1
4-8 TABLET, ORALLY DISINTEGRATING ORAL EVERY 8 HOURS PRN
Qty: 12 TABLET | Refills: 0 | Status: SHIPPED | OUTPATIENT
Start: 2019-07-25 | End: 2019-10-02

## 2019-07-25 RX ORDER — EPHEDRINE SULFATE 50 MG/ML
INJECTION, SOLUTION INTRAMUSCULAR; INTRAVENOUS; SUBCUTANEOUS PRN
Status: DISCONTINUED | OUTPATIENT
Start: 2019-07-25 | End: 2019-07-25

## 2019-07-25 RX ORDER — CEFAZOLIN SODIUM 2 G/100ML
2 INJECTION, SOLUTION INTRAVENOUS
Status: COMPLETED | OUTPATIENT
Start: 2019-07-25 | End: 2019-07-25

## 2019-07-25 RX ORDER — DEXAMETHASONE SODIUM PHOSPHATE 4 MG/ML
INJECTION, SOLUTION INTRA-ARTICULAR; INTRALESIONAL; INTRAMUSCULAR; INTRAVENOUS; SOFT TISSUE PRN
Status: DISCONTINUED | OUTPATIENT
Start: 2019-07-25 | End: 2019-07-25

## 2019-07-25 RX ORDER — FENTANYL CITRATE 50 UG/ML
INJECTION, SOLUTION INTRAMUSCULAR; INTRAVENOUS PRN
Status: DISCONTINUED | OUTPATIENT
Start: 2019-07-25 | End: 2019-07-25

## 2019-07-25 RX ORDER — HYDROXYZINE PAMOATE 25 MG/1
25 CAPSULE ORAL 3 TIMES DAILY PRN
Qty: 30 CAPSULE | Refills: 0 | Status: SHIPPED | OUTPATIENT
Start: 2019-07-25

## 2019-07-25 RX ORDER — LIDOCAINE HYDROCHLORIDE 10 MG/ML
INJECTION, SOLUTION INFILTRATION; PERINEURAL PRN
Status: DISCONTINUED | OUTPATIENT
Start: 2019-07-25 | End: 2019-07-25

## 2019-07-25 RX ORDER — LIDOCAINE 40 MG/G
CREAM TOPICAL
Status: DISCONTINUED | OUTPATIENT
Start: 2019-07-25 | End: 2019-07-25 | Stop reason: HOSPADM

## 2019-07-25 RX ORDER — AMOXICILLIN 250 MG
1-2 CAPSULE ORAL 2 TIMES DAILY
Qty: 30 TABLET | Refills: 0 | Status: SHIPPED | OUTPATIENT
Start: 2019-07-25 | End: 2019-10-02

## 2019-07-25 RX ORDER — OXYCODONE HYDROCHLORIDE 5 MG/1
5 TABLET ORAL
Status: DISCONTINUED | OUTPATIENT
Start: 2019-07-25 | End: 2019-07-25 | Stop reason: HOSPADM

## 2019-07-25 RX ORDER — SODIUM CHLORIDE, SODIUM LACTATE, POTASSIUM CHLORIDE, CALCIUM CHLORIDE 600; 310; 30; 20 MG/100ML; MG/100ML; MG/100ML; MG/100ML
INJECTION, SOLUTION INTRAVENOUS CONTINUOUS
Status: DISCONTINUED | OUTPATIENT
Start: 2019-07-25 | End: 2019-07-25 | Stop reason: HOSPADM

## 2019-07-25 RX ORDER — OXYCODONE HYDROCHLORIDE 5 MG/1
5-10 TABLET ORAL EVERY 4 HOURS PRN
Qty: 30 TABLET | Refills: 0 | Status: SHIPPED | OUTPATIENT
Start: 2019-07-25 | End: 2019-10-02

## 2019-07-25 RX ORDER — ONDANSETRON 2 MG/ML
INJECTION INTRAMUSCULAR; INTRAVENOUS PRN
Status: DISCONTINUED | OUTPATIENT
Start: 2019-07-25 | End: 2019-07-25

## 2019-07-25 RX ADMIN — SODIUM CHLORIDE, POTASSIUM CHLORIDE, SODIUM LACTATE AND CALCIUM CHLORIDE: 600; 310; 30; 20 INJECTION, SOLUTION INTRAVENOUS at 08:35

## 2019-07-25 RX ADMIN — PROPOFOL 100 MCG/KG/MIN: 10 INJECTION, EMULSION INTRAVENOUS at 09:06

## 2019-07-25 RX ADMIN — GLYCOPYRROLATE 0.2 MG: 0.2 INJECTION, SOLUTION INTRAMUSCULAR; INTRAVENOUS at 09:05

## 2019-07-25 RX ADMIN — HYDROMORPHONE HYDROCHLORIDE 1 MG: 1 INJECTION, SOLUTION INTRAMUSCULAR; INTRAVENOUS; SUBCUTANEOUS at 09:14

## 2019-07-25 RX ADMIN — Medication 10 MG: at 10:06

## 2019-07-25 RX ADMIN — MIDAZOLAM 1 MG: 1 INJECTION INTRAMUSCULAR; INTRAVENOUS at 08:56

## 2019-07-25 RX ADMIN — PROPOFOL 200 MG: 10 INJECTION, EMULSION INTRAVENOUS at 09:05

## 2019-07-25 RX ADMIN — FENTANYL CITRATE 100 MCG: 50 INJECTION, SOLUTION INTRAMUSCULAR; INTRAVENOUS at 09:05

## 2019-07-25 RX ADMIN — ONDANSETRON 4 MG: 2 INJECTION INTRAMUSCULAR; INTRAVENOUS at 10:00

## 2019-07-25 RX ADMIN — MIDAZOLAM 1 MG: 1 INJECTION INTRAMUSCULAR; INTRAVENOUS at 08:42

## 2019-07-25 RX ADMIN — GLYCOPYRROLATE 0.2 MG: 0.2 INJECTION, SOLUTION INTRAMUSCULAR; INTRAVENOUS at 10:18

## 2019-07-25 RX ADMIN — DEXAMETHASONE SODIUM PHOSPHATE 4 MG: 4 INJECTION, SOLUTION INTRA-ARTICULAR; INTRALESIONAL; INTRAMUSCULAR; INTRAVENOUS; SOFT TISSUE at 09:09

## 2019-07-25 RX ADMIN — BUPIVACAINE HYDROCHLORIDE AND EPINEPHRINE BITARTRATE 30 ML: 5; .005 INJECTION, SOLUTION EPIDURAL; INTRACAUDAL; PERINEURAL at 08:34

## 2019-07-25 RX ADMIN — Medication 2 MG: at 10:18

## 2019-07-25 RX ADMIN — LIDOCAINE HYDROCHLORIDE 50 MG: 10 INJECTION, SOLUTION INFILTRATION; PERINEURAL at 09:05

## 2019-07-25 RX ADMIN — CEFAZOLIN SODIUM 2 G: 2 INJECTION, SOLUTION INTRAVENOUS at 09:09

## 2019-07-25 RX ADMIN — KETOROLAC TROMETHAMINE 30 MG: 30 INJECTION, SOLUTION INTRAMUSCULAR at 10:18

## 2019-07-25 RX ADMIN — SODIUM CHLORIDE, POTASSIUM CHLORIDE, SODIUM LACTATE AND CALCIUM CHLORIDE: 600; 310; 30; 20 INJECTION, SOLUTION INTRAVENOUS at 09:47

## 2019-07-25 RX ADMIN — ROCURONIUM BROMIDE 30 MG: 10 INJECTION INTRAVENOUS at 09:05

## 2019-07-25 RX ADMIN — MIDAZOLAM 2 MG: 1 INJECTION INTRAMUSCULAR; INTRAVENOUS at 08:22

## 2019-07-25 ASSESSMENT — ENCOUNTER SYMPTOMS: DYSRHYTHMIAS: 0

## 2019-07-25 ASSESSMENT — MIFFLIN-ST. JEOR: SCORE: 1757.21

## 2019-07-25 NOTE — DISCHARGE INSTRUCTIONS
GENERAL ANESTHESIA OR SEDATION ADULT DISCHARGE INSTRUCTIONS   SPECIAL PRECAUTIONS FOR 24 HOURS AFTER SURGERY    IT IS NOT UNUSUAL TO FEEL LIGHT-HEADED OR FAINT, UP TO 24 HOURS AFTER SURGERY OR WHILE TAKING PAIN MEDICATION.  IF YOU HAVE THESE SYMPTOMS; SIT FOR A FEW MINUTES BEFORE STANDING AND HAVE SOMEONE ASSIST YOU WHEN YOU GET UP TO WALK OR USE THE BATHROOM.    YOU SHOULD REST AND RELAX FOR THE NEXT 24 HOURS AND YOU MUST MAKE ARRANGEMENTS TO HAVE SOMEONE STAY WITH YOU FOR AT LEAST 24 HOURS AFTER YOUR DISCHARGE.  AVOID HAZARDOUS AND STRENUOUS ACTIVITIES.  DO NOT MAKE IMPORTANT DECISIONS FOR 24 HOURS.    DO NOT DRIVE ANY VEHICLE OR OPERATE MECHANICAL EQUIPMENT FOR 24 HOURS FOLLOWING THE END OF YOUR SURGERY.  EVEN THOUGH YOU MAY FEEL NORMAL, YOUR REACTIONS MAY BE AFFECTED BY THE MEDICATION YOU HAVE RECEIVED.    DO NOT DRINK ALCOHOLIC BEVERAGES FOR 24 HOURS FOLLOWING YOUR SURGERY.    DRINK CLEAR LIQUIDS (APPLE JUICE, GINGER ALE, 7-UP, BROTH, ETC.).  PROGRESS TO YOUR REGULAR DIET AS YOU FEEL ABLE.    YOU MAY HAVE A DRY MOUTH, A SORE THROAT, MUSCLES ACHES OR TROUBLE SLEEPING.  THESE SHOULD GO AWAY AFTER 24 HOURS.    CALL YOUR DOCTOR FOR ANY OF THE FOLLOWING:  SIGNS OF INFECTION (FEVER, GROWING TENDERNESS AT THE SURGERY SITE, A LARGE AMOUNT OF DRAINAGE OR BLEEDING, SEVERE PAIN, FOUL-SMELLING DRAINAGE, REDNESS OR SWELLING.    IT HAS BEEN OVER 8 TO 10 HOURS SINCE SURGERY AND YOU ARE STILL NOT ABLE TO URINATE (PASS WATER).

## 2019-07-25 NOTE — ANESTHESIA CARE TRANSFER NOTE
Patient: Claudia Heaton    Procedure(s):  Midfoot fusion, left foot    Diagnosis: arthritis  Diagnosis Additional Information: No value filed.    Anesthesia Type:   General, ETT, Periph. Nerve Block for postop pain     Note:  Airway :Face Mask  Patient transferred to:PACU  Comments: Andres Report: Identifed the Patient, Identified the Reponsible Provider, Reviewed the pertinent medical history, Discussed the surgical course, Reviewed Intra-OP anesthesia mangement and issues during anesthesia, Set expectations for post-procedure period and Allowed opportunity for questions and acknowledgement of understanding      Vitals: (Last set prior to Anesthesia Care Transfer)    CRNA VITALS  7/25/2019 0953 - 7/25/2019 1029      7/25/2019             NIBP:  114/96  (Abnormal)     Pulse:  81    NIBP Mean:  102    SpO2:  85 %  (Abnormal)     Resp Rate (observed):  2  (Abnormal)                 Electronically Signed By: DOMINICK Castillo CRNA  July 25, 2019  10:29 AM

## 2019-07-25 NOTE — ANESTHESIA POSTPROCEDURE EVALUATION
Patient: Claudia Heaton    Procedure(s):  Midfoot fusion, left foot    Diagnosis:arthritis  Diagnosis Additional Information: No value filed.    Anesthesia Type:  General, ETT, Periph. Nerve Block for postop pain    Note:  Anesthesia Post Evaluation    Patient location during evaluation: PACU  Patient participation: Able to fully participate in evaluation  Level of consciousness: awake  Pain management: adequate  Airway patency: patent  Cardiovascular status: acceptable  Respiratory status: acceptable  Hydration status: euvolemic  PONV: controlled     Anesthetic complications: None          Last vitals:  Vitals:    07/25/19 1215 07/25/19 1324 07/25/19 1358   BP: 117/66 141/82 141/81   Pulse: 70     Resp: 19  16   Temp:  97.8  F (36.6  C)    SpO2: 93% 94% 92%         Electronically Signed By: Yovany Carballo MD  July 25, 2019  1:59 PM

## 2019-07-25 NOTE — OP NOTE
Procedure Date: 07/25/2019     SURGEON: Bia Medellin DPM     FIRST ASSISTANT:  Valeria Reed DPM.      PREOPERATIVE DIAGNOSES:   1.  Left foot pain.   2.  Left foot bone spurring.   3.  Left foot second metatarsal cuneiform arthritis.      ANESTHESIA:  General.      POSTOPERATIVE DIAGNOSES:   1.  Left foot pain.   2.  Left foot bone spurring.   3.  Left foot second metatarsal cuneiform arthritis.      PROCEDURES:   1.  Left foot bone spur removal.   2.  Second met cuneiform fusion.      ANESTHESIA:  General with popliteal block.      HEMOSTASIS:  Pneumatic thigh tourniquet with electrocautery.      ESTIMATED BLOOD LOSS:  Less than 10 mL.      SPECIMENS:  Left foot bone for pathology.      MATERIALS:  One Arthrex 3.0 partially threaded cannulated screw.      INDICATIONS:  Ms. Heaton is a 48-year-old female who presented to clinic with pain to the top of the left foot.  She has a palpable immobile hard mass.  X-rays show significant bony spurring to this area.  A CT shows arthritic changes to the second met cuneiform joint.  It was discussed with patient to go in and remove the spur and fuse the joint to prevent further spurring in this area.  Discussed that we may fuse the first met cuneiform if the Lisfranc ligament seems like it is affected as well.  Risks, benefits and complications were discussed with the patient, and no guarantees were made.  Patient wished to proceed with surgery.      OPERATIVE PROCEDURE:  The patient was brought to the operating room, placed on the operating table in supine position.  Anesthesia was administered and the foot was prepped and draped using sterile technique.  The dorsalis pedis pulse was palpated and a linear incision was made approximately 3 mm lateral to this area, 6 cm in length through the skin.  Blunt dissection was used down to the level of the muscle and all vessels that were noted were ligated with electrocautery.  The muscle and tendon sheath were incised and the  muscle was retracted laterally, exposing the periosteum.  The dorsalis pedis and concomitant veins were then noted and these were retracted medially with a vessel loop.  A linear incision was made through periosteum and this was sharply dissected off of the base of the second metatarsal and middle cuneiform, exposing the bone spur and arthritic joint.  The bone spur was removed using a sagittal saw.  The joint was scraped using curettage and then fenestrated with a 0.045 smooth K-wire on both sides to promote healing.  A 3.0 mm cannulated Arthrex screw was then placed across the joint to help with fusion.  The foot, mid foot was stressed under fluoroscan and there was no injury or diastasis noticed around the Lisfranc ligament and therefore, it was decided not to fuse the first met cuneiform joint.  The wound was flushed with copious amounts of normal saline.  The tourniquet was let down and there was no pulsatile bleeding noted.  The periosteum was reapproximated using 3-0 Vicryl, the subcutaneous was reapproximated using 4-0 Vicryl, and the skin was reapproximated with 4-0 Prolene.  The patient's foot was placed in a dry sterile dressing.  The patient tolerated the procedure and anesthesia well and was transferred to recovery with vital signs stable and vascular status intact.      PLAN:  The patient will be nonweightbearing on the left foot.  She was given oxycodone for pain.         RENÉE GOMEZ DPM             D: 2019   T: 2019   MT: KENROY      Name:     BLOSSOM GOMEZ   MRN:      [id number]        Account:        [id number]   :      1970           Procedure Date: 2019      Document: [id number]

## 2019-07-25 NOTE — ANESTHESIA PROCEDURE NOTES
Peripheral nerve/Neuraxial procedure note : Popliteal and Sciatic  Pre-Procedure  Performed by Yang Mcqueen MD  Location: pre-op    Procedure Times:7/25/2019 8:22 AM and 7/25/2019 8:34 AM  Pre-Anesthestic Checklist: patient identified, IV checked, site marked, risks and benefits discussed, informed consent, monitors and equipment checked, pre-op evaluation, at physician/surgeon's request and post-op pain management    Timeout  Correct Patient: Yes   Correct Procedure: Yes   Correct Site: Yes   Correct Laterality: Yes   Correct Position: Yes   Site Marked: Yes   .   Procedure Documentation    .    Procedure:  left  Popliteal and Sciatic.  Local skin infiltrated with 0.3 mL of 1% lidocaine.     Ultrasound used to identify targeted nerve, plexus, or vascular marker and placed a needle adjacent to it., Ultrasound was used to visualize the spread of the anesthetic in close proximity to the above stated nerve.   Patient Prep;sterile gloves, povidone-iodine 7.5% surgical scrub.  .  Needle: insulated Needle Gauge: 22.  Needle Length (millimeters) 80  Insertion Method: Single Shot.       Assessment/Narrative  Paresthesias: No.  Injection made incrementally with aspirations every 5 mL..  The placement was negative for: blood aspirated, painful injection and site bleeding.  Bolus given via needle. No blood aspirated via catheter.   Secured via.   Complications:. Comments:  30 ml 0.5% bupivicaine with 1:200,000 epinephrine placed

## 2019-07-25 NOTE — ANESTHESIA PREPROCEDURE EVALUATION
Anesthesia Pre-Procedure Evaluation    Patient: Claudia Heaton   MRN: [id number] : 1970          Preoperative Diagnosis: arthritis    Procedure(s):  Midfoot fusion, left foot    History reviewed. No pertinent past medical history.  Past Surgical History:   Procedure Laterality Date     C  DELIVERY ONLY       Anesthesia Evaluation     .             ROS/MED HX    ENT/Pulmonary:      (-) asthma, sleep apnea and Other pulmonary disease   Neurologic:      (-) TIA, Other neuro hx and Dementia   Cardiovascular:        (-) hypertension, CAD, arrhythmias, pulmonary hypertension and dyslipidemia   METS/Exercise Tolerance:     Hematologic:        (-) anemia   Musculoskeletal:   (+)  other musculoskeletal- foot issue     (-) arthritis   GI/Hepatic:        (-) GERD   Renal/Genitourinary:         Endo: Comment: Was hypothyroid, then euthyroid, now to restart thyroid replacement in several weeks    (+) thyroid problem  Thyroid disease - Other, Obesity, .      Psychiatric:        (-) psychiatric history   Infectious Disease:         Malignancy:         Other:                          Physical Exam  Normal systems: cardiovascular, pulmonary and dental    Airway   Mallampati: III  TM distance: >3 FB  Neck ROM: full    Dental     Cardiovascular   Rhythm and rate: regular and normal  (-) no murmur    Pulmonary    breath sounds clear to auscultation    Other findings: Lab Test        19                       1007          HGB          13.5           No lab results found.  Morbid obesity (class 3)        Lab Results   Component Value Date    HGB 13.5 2019       Preop Vitals  BP Readings from Last 3 Encounters:   19 120/80   19 128/88   19 136/90    Pulse Readings from Last 3 Encounters:   19 79   19 94   11 62      Resp Readings from Last 3 Encounters:   19 16   19 16    SpO2 Readings from Last 3 Encounters:   19 97%   19 97%      Temp Readings from  "Last 1 Encounters:   07/18/19 97.4  F (36.3  C) (Tympanic)    Ht Readings from Last 1 Encounters:   07/25/19 1.638 m (5' 4.49\")      Wt Readings from Last 1 Encounters:   07/25/19 112.9 kg (249 lb)    Estimated body mass index is 42.1 kg/m  as calculated from the following:    Height as of this encounter: 1.638 m (5' 4.49\").    Weight as of this encounter: 112.9 kg (249 lb).       Anesthesia Plan      History & Physical Review  History and physical reviewed and following examination; no interval change.    ASA Status:  3 .    NPO Status:  > 8 hours    Plan for General, ETT and Periph. Nerve Block for postop pain with Propofol induction. Maintenance will be Balanced.    PONV prophylaxis:  Ondansetron (or other 5HT-3)  Propofol gtt + Sevo + 60% O2 please      Postoperative Care  Postoperative pain management:  IV analgesics, Oral pain medications, Peripheral nerve block (Single Shot) and Multi-modal analgesia.      Consents  Anesthetic plan, risks, benefits and alternatives discussed with:  Patient..                 Yovany Carballo MD                    .  "

## 2019-07-25 NOTE — OR NURSING
Verbal demonstration of crutches given to patient along with handout.  Offered to have patient demonstrate use, declined to practice.  discharge to home with mother.

## 2019-07-25 NOTE — BRIEF OP NOTE
[facility]    Brief Operative Note    Pre-operative diagnosis: Left foot arthritis and bone spurring.   Post-operative diagnosis Left 2nd metatarsal cuneiform fusion.  Procedure: Procedure(s):  Midfoot fusion, left foot  Surgeon: Surgeon(s) and Role:     * Bia Medellin DPM, Podiatry/Foot and Ankle Surgery - Primary   First Assist: Valeria Reed DPM  Anesthesia: Combined General with Popliteal Block   Estimated blood loss: Less than 10 ml  Drains: None  Specimens:   ID Type Source Tests Collected by Time Destination   A : LEFT FOOT BONE Tissue Foot, Left SURGICAL PATHOLOGY EXAM Bia Medellin DPM, Podiatry/Foot and Ankle Surgery 7/25/2019  9:40 AM      Findings:   None.  Complications: None   .  Implants:    Implant Name Type Inv. Item Serial No.  Lot No. LRB No. Used   IMP SCR [employer] QUICKFIX CANC PT 3.0X26MM TI [id number] Metallic Hardware/[place] IMP SCR [employer] QUICKFIX CANC PT 3.0X26MM TI [id number]  ARTHREX [id number] Left 1

## 2019-07-29 LAB — COPATH REPORT: NORMAL

## 2019-08-07 ENCOUNTER — ANCILLARY PROCEDURE (OUTPATIENT)
Dept: GENERAL RADIOLOGY | Facility: CLINIC | Age: 49
End: 2019-08-07
Attending: PODIATRIST
Payer: COMMERCIAL

## 2019-08-07 ENCOUNTER — OFFICE VISIT (OUTPATIENT)
Dept: PODIATRY | Facility: CLINIC | Age: 49
End: 2019-08-07
Payer: COMMERCIAL

## 2019-08-07 VITALS
WEIGHT: 249 LBS | HEIGHT: 64 IN | DIASTOLIC BLOOD PRESSURE: 80 MMHG | BODY MASS INDEX: 42.51 KG/M2 | SYSTOLIC BLOOD PRESSURE: 136 MMHG

## 2019-08-07 DIAGNOSIS — Z98.890 POST-OPERATIVE STATE: Primary | ICD-10-CM

## 2019-08-07 DIAGNOSIS — Z98.890 POST-OPERATIVE STATE: ICD-10-CM

## 2019-08-07 PROCEDURE — 73630 X-RAY EXAM OF FOOT: CPT | Mod: LT

## 2019-08-07 PROCEDURE — 99024 POSTOP FOLLOW-UP VISIT: CPT | Performed by: PODIATRIST

## 2019-08-07 ASSESSMENT — MIFFLIN-ST. JEOR: SCORE: 1757.24

## 2019-08-07 NOTE — LETTER
"    8/7/2019         RE: Claudia Heaton  [address]  [place] [facility] [address]        Dear Colleague,    Thank you for referring your patient, Claudia Heaton, to the [facility]. Please see a copy of my visit note below.    Podiatry / Foot and Ankle Surgery Progress Note    August 7, 2019    Subject: Patient was seen for follow up on 1 1/2 weeks s/p left foot spur removal and 2nd metatarsal cuneiform fusion. Notes pain is tolerable. Has been compliant with weight bearing. States she had a slight temp yesterday but came down with tylenol.     Objective:  Vitals: /80   Ht 1.638 m (5' 4.49\")   Wt 112.9 kg (249 lb)   BMI 42.09 kg/m     BMI= Body mass index is 42.09 kg/m .    General:  Patient is alert and orientated.  NAD  Dressing is c/d/i. Sutures intact. No redness, dehiscence or signs of infection. CFT's < 3secs.     Imaging: left foot xrays.  Hardware in good position. Spur removed.     Assessment: Post-operative state    Plan:  Sutures removed. Can get foot wet. Continue non weight bearing. Can start heel weight bearing in boot in 1-2 weeks. Follow up in 1 month.     Bia Medellin DPM, Podiatry/Foot and Ankle Surgery    Weight management plan: Patient was referred to their PCP to discuss a diet and exercise plan.    Recommended to Claudia Heaton to follow up with Primary Care provider regarding elevated blood pressure.      Again, thank you for allowing me to participate in the care of your patient.        Sincerely,        Bia Medellin DPM, Podiatry/Foot and Ankle Surgery    "

## 2019-08-07 NOTE — PROGRESS NOTES
"Podiatry / Foot and Ankle Surgery Progress Note    August 7, 2019    Subject: Patient was seen for follow up on 1 1/2 weeks s/p left foot spur removal and 2nd metatarsal cuneiform fusion. Notes pain is tolerable. Has been compliant with weight bearing. States she had a slight temp yesterday but came down with tylenol.     Objective:  Vitals: /80   Ht 1.638 m (5' 4.49\")   Wt 112.9 kg (249 lb)   BMI 42.09 kg/m    BMI= Body mass index is 42.09 kg/m .    General:  Patient is alert and orientated.  NAD  Dressing is c/d/i. Sutures intact. No redness, dehiscence or signs of infection. CFT's < 3secs.     Imaging: left foot xrays.  Hardware in good position. Spur removed.     Assessment: Post-operative state    Plan:  Sutures removed. Can get foot wet. Continue non weight bearing. Can start heel weight bearing in boot in 1-2 weeks. Follow up in 1 month.     Bia Medellin DPM, Podiatry/Foot and Ankle Surgery    Weight management plan: Patient was referred to their PCP to discuss a diet and exercise plan.    Recommended to Claudia Heaton to follow up with Primary Care provider regarding elevated blood pressure.    "

## 2019-08-07 NOTE — PATIENT INSTRUCTIONS
Thank you for choosing [facility]!    DR. GOMEZ'S CLINIC SCHEDULE  MONDAY AM - [place] TUESDAY - [place]   [address] [address] [place] [address] [place] [address]   [phone] / [phone] / [phone]       WEDNESDAY - [place] FRIDAY AM - WOUND CENTER   [address]   [place] [address] [place] [address]   [phone] / [phone]       FRIDAY PM - [place] SCHEDULE SURGERY: [phone]   [address] [facility] BILLING QUESTIONS: [phone]   [place] [address] AFTER HOURS: [phone]   [phone] / [phone] APPOINTMENTS: [phone]     Consumer Price Line (CPL) [phone]       One month follow up      SCAR CARE PROTOCOL  Scarring is an unfortunate but unavoidable part of surgery.  Every person scars differently and there is no way to predict how an individual's final scar will look.  Now that the sutures have been removed one can begin taking some steps to help minimize the appearance of scarring.    WOUND HEALING  As soon as the skin is incised during surgery, the body is taking steps to prepare for healing. After about 3 days, the body has sent cells to the incision to begin the healing process. These cells, called fibroblasts, make collagen, a protein in the skin that helps provide strength. Once the skin has been sufficiently strengthened, the sutures are removed. Over the next year, the body synthesizes new collagen and breaks down old collagen to help achieve a strong scar that allows the foot/ankle to function appropriately. This is where patients can help the appearance of the scar, as it will change over the next year.    STEPS  1. Do not expose the scar to the sun for 1 year.  2. Any sun exposure may permanently darken the appearance of the scar.  3. Wear shoes/socks or cover your scar with zinc oxide.  4. Massage the scar 2-3 times per day.  -Massage  the entire length of the scar with gentle to moderate pressure.  -Pressure can help flatten the scar.  5. Lotion/Vitamin E helps keep the tissue soft.  6. Try over the counter scar products such as Mederma or Scar Zone.  -These are available at any pharmacy without a prescription.  -Patients must use these for extended periods of time (6-12 months) to see a difference.        BODY WEIGHT AND YOUR FEET  The following information is included in the after visit summary for all patients. Body weight can be a sensitive issue to discuss in clinic, but we think the following information is very important. Although we focus on the feet and ankles, we do support the overall health of our patients.     Many things can cause foot and ankle problems. Foot structure, activity level, foot mechanics and injuries are common causes of pain. One very important issue that often goes unmentioned, is body weight. Extra weight can cause increased stress on muscles, ligaments, bones and tendons. Sometimes just a few extra pounds is all it takes to put one over her/his threshold. Without reducing that stress, it can be difficult to alleviate pain. As Foot & Ankle specialists, our job is addressing the lower extremity problem and possible causes. Regarding extra body weight, we encourage patients to discuss diet and weight management plans with their primary care doctors. It is this team approach that gives you the best opportunity for pain relief and getting you back on your feet.      [facility] has a Comprehensive Weight Management Program. This program includes counseling, education, non-surgical and surgical approaches to weight loss. If you are interested in learning more either talk to you primary care provider or call [phone].

## 2019-08-19 PROBLEM — M79.672 LEFT FOOT PAIN: Status: RESOLVED | Noted: 2019-04-01 | Resolved: 2019-08-19

## 2019-08-19 NOTE — PROGRESS NOTES
Subjective:  HPI                    Objective:  System    Physical Exam    General     ROS    Assessment/Plan:    DISCHARGE REPORT    Progress reporting period is from 4/1/2019 to 4/16/2019.       SUBJECTIVE  Subjective changes noted by patient:  As of last PT visit on 4/16/2019, pt reports that she tried pilates yesterday and so her foot is a little more sore today.  She has not returned for any additional PT since 4/16/2019, so current subjective changes are unknown.    Changes in function:  Yes (See Goal flowsheet attached for changes in current functional level)    OBJECTIVE  Changes noted in objective findings:  The objective findings below are from DOS 4/16/2019.  Objective: increased swelling in foot today due to irritation from pilates class yesterday.     ASSESSMENT/PLAN  STG/LTGs have been met or progress has been made towards goals:  Yes (See Goal flow sheet completed today.)  Assessment of Progress: The patient has not returned to therapy. Current status is unknown.  Self Management Plans:  Patient has been instructed in a home treatment program.    Recommendations:  Pt will be discharged from PT.

## 2019-09-04 ENCOUNTER — OFFICE VISIT (OUTPATIENT)
Dept: PODIATRY | Facility: CLINIC | Age: 49
End: 2019-09-04
Payer: COMMERCIAL

## 2019-09-04 VITALS
HEIGHT: 64 IN | DIASTOLIC BLOOD PRESSURE: 66 MMHG | WEIGHT: 249 LBS | SYSTOLIC BLOOD PRESSURE: 130 MMHG | BODY MASS INDEX: 42.51 KG/M2

## 2019-09-04 DIAGNOSIS — Z98.890 POST-OPERATIVE STATE: Primary | ICD-10-CM

## 2019-09-04 PROCEDURE — 99024 POSTOP FOLLOW-UP VISIT: CPT | Performed by: PODIATRIST

## 2019-09-04 ASSESSMENT — MIFFLIN-ST. JEOR: SCORE: 1757.24

## 2019-09-04 NOTE — PATIENT INSTRUCTIONS
1 month follow up.    Apply iodine/betadine daily to incision area with large bandaid.       Thank you for choosing [facility]!    DR. GOMEZ'S CLINIC SCHEDULE  MONDAY AM - [place] TUESDAY - [place]   [address] [address] [place] [address] [place], [place] [id number] [phone] / [phone] / [phone]       WEDNESDAY - [place] FRIDAY AM - [facility]   [address]   [place] [address] [place] [address]   [phone] / [phone]       FRIDAY PM - [place] SCHEDULE SURGERY: [phone]   [address] [facility] [address] BILLING QUESTIONS: [phone]   [place] [address] AFTER HOURS: [phone]   [phone] / [phone] APPOINTMENTS: [phone]     Consumer Price Line (CPL) [phone]

## 2019-09-04 NOTE — PROGRESS NOTES
"Podiatry / Foot and Ankle Surgery Progress Note    September 4, 2019    Subject: Patient was seen for follow up on 6 weeks s/p 2nd metatarsal cuneiform fusion and spur removal. Has been putting aquaphor to incision. Denies fever, chills nausa. Notes tightness to skin but pain to foot is minimal.     Objective:  Vitals: /66   Ht 1.638 m (5' 4.49\")   Wt 112.9 kg (249 lb)   BMI 42.09 kg/m    BMI= Body mass index is 42.09 kg/m .    General:  Patient is alert and orientated.  NAD  Foot incision is dehisced a little.  No redness, or signs of infection.       Assessment: Post-operative state    Plan:  Recommend iodine daily to foot and bandaid. Will start PT and progressing weight bearing to shoes. Follow up in 1 month for reassessment. Was told to call with questions or concers.     Bia Medellin DPM, Podiatry/Foot and Ankle Surgery    Weight management plan: Patient was referred to their PCP to discuss a diet and exercise plan.    Recommended to Claudia Heaton to follow up with Primary Care provider regarding elevated blood pressure.    "

## 2019-09-04 NOTE — LETTER
"    9/4/2019         RE: Claudia Heaton  [address]  [facility] [address]        Dear Colleague,    Thank you for referring your patient, Claudia Heaton, to the [facility]. Please see a copy of my visit note below.    Podiatry / Foot and Ankle Surgery Progress Note    September 4, 2019    Subject: Patient was seen for follow up on 6 weeks s/p 2nd metatarsal cuneiform fusion and spur removal. Has been putting aquaphor to incision. Denies fever, chills nausa. Notes tightness to skin but pain to foot is minimal.     Objective:  Vitals: /66   Ht 1.638 m (5' 4.49\")   Wt 112.9 kg (249 lb)   BMI 42.09 kg/m     BMI= Body mass index is 42.09 kg/m .    General:  Patient is alert and orientated.  NAD  Foot incision is dehisced a little.  No redness, or signs of infection.       Assessment: Post-operative state    Plan:  Recommend iodine daily to foot and bandaid. Will start PT and progressing weight bearing to shoes. Follow up in 1 month for reassessment. Was told to call with questions or concers.     Bia Medellin DPM, Podiatry/Foot and Ankle Surgery    Weight management plan: Patient was referred to their PCP to discuss a diet and exercise plan.    Recommended to Claudia Heaton to follow up with Primary Care provider regarding elevated blood pressure.      Again, thank you for allowing me to participate in the care of your patient.        Sincerely,        Bia Medellin DPM, Podiatry/Foot and Ankle Surgery    "

## 2019-09-09 ENCOUNTER — THERAPY VISIT (OUTPATIENT)
Dept: PHYSICAL THERAPY | Facility: CLINIC | Age: 49
End: 2019-09-09
Attending: PODIATRIST
Payer: COMMERCIAL

## 2019-09-09 DIAGNOSIS — Z98.890 POST-OPERATIVE STATE: ICD-10-CM

## 2019-09-09 DIAGNOSIS — M25.572 ACUTE LEFT ANKLE PAIN: ICD-10-CM

## 2019-09-09 PROCEDURE — 97110 THERAPEUTIC EXERCISES: CPT | Mod: GP | Performed by: PHYSICAL THERAPIST

## 2019-09-09 PROCEDURE — 97161 PT EVAL LOW COMPLEX 20 MIN: CPT | Mod: GP | Performed by: PHYSICAL THERAPIST

## 2019-09-09 NOTE — PROGRESS NOTES
[facility] Initial Evaluation  Subjective:  Pt will be partial WBing in boot (using heel or just to stabilize) when walking outside and as tolerated over the next couple of weeks.  In a couple of weeks she can try a shoe (as tolerated)    The history is provided by the patient. No  was used.   Claudia Heaton being seen for s/p L bone spur removal and midfoot fusion.   Problem began 7/25/2019. Problem occurred: arthritis  and reported as 0/10 (no pain unless she bumps it) on pain scale. General health as reported by patient is good. Pertinent medical history includes:  Overweight and thyroid problems.      Current medications:  Pain medication and thyroid medication.   Primary job tasks include:  Computer work and repetitive tasks.   Pain frequency: no pain.  Since onset symptoms are gradually improving. Special tests:  X-ray. Previous treatment includes physical therapy. There was mild improvement following previous treatment.   Patient is . 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    Barriers include:  None as reported by patient.  Red flags:  None as reported by patient.  Type of problem:  Left foot   Condition occurred with:  Degenerative joint disease. This is a new condition    Site of Pain: incisional, on heel with weigthtbearing.  Associated symptoms:  Loss of motion/stiffness, loss of strength and edema. Symptoms are exacerbated by weight bearing and relieved by rest (compression sleeve, tylenol).                      Objective:    Gait:    Weight Bearing Status:  PWB   Assistive Devices:  Brace and crutches            Ankle/Foot Evaluation  ROM:    AROM:    Dorsiflexion:  Left:   5  Right:   20  Plantarflexion:  Left:  53    Right:  60  Inversion:  Left:  30     Right:  40  Eversion:  15     Right:  20        Strength:    Dorsiflexion:  Left: 4+/5     Pain:   Right: 5/5   Pain:    Inversion:Left: 5/5  Pain:       Eversion:Left: 5/5   Pain:        Anterior Tibialis:Right: 5/5  Pain:  Posterior Tibialis: Right: 5/5  Pain:  Peroneals: Right: 5/5  Pain:  Extensor Digitorum: Right: 5/5  Pain:                                                                  General     ROS    Assessment/Plan:    Patient is a 48 year old female with left side ankle complaints.    Patient has the following significant findings with corresponding treatment plan.                Diagnosis 1:  L ankle pain/stiffness s/p bone spur removal and midfoot fusion  Pain -  hot/cold therapy  Decreased ROM/flexibility - therapeutic exercise and home program  Decreased strength - therapeutic exercise, therapeutic activities and home program  Impaired balance - neuro re-education, therapeutic activities and home program  Decreased proprioception - neuro re-education, therapeutic activities and home program  Edema - cold therapy  Impaired gait - gait training  Impaired muscle performance - neuro re-education and home program  Decreased function - therapeutic activities and home program  Instability -  Therapeutic Activity  Therapeutic Exercise  home program    Therapy Evaluation Codes:   Cumulative Therapy Evaluation is: Low complexity.    Previous and current functional limitations:  (See Goal Flow Sheet for this information)    Short term and Long term goals: (See Goal Flow Sheet for this information)     Communication ability:  Patient appears to be able to clearly communicate and understand verbal and written communication and follow directions correctly.  Treatment Explanation - The following has been discussed with the patient:   RX ordered/plan of care  Anticipated outcomes  Possible risks and side effects  This patient would benefit from PT intervention to resume normal activities.   Rehab potential is good.    Frequency:  1 X week, once daily  Duration:  for 6-8 weeks  Discharge Plan:  Achieve all LTG.  Independent in home treatment program.  Reach maximal therapeutic  benefit.    Please refer to the daily flowsheet for treatment today, total treatment time and time spent performing 1:1 timed codes.

## 2019-09-16 ENCOUNTER — THERAPY VISIT (OUTPATIENT)
Dept: PHYSICAL THERAPY | Facility: CLINIC | Age: 49
End: 2019-09-16
Attending: PODIATRIST
Payer: COMMERCIAL

## 2019-09-16 DIAGNOSIS — M25.572 ACUTE LEFT ANKLE PAIN: ICD-10-CM

## 2019-09-16 PROCEDURE — 97110 THERAPEUTIC EXERCISES: CPT | Mod: GP | Performed by: PHYSICAL THERAPIST

## 2019-09-16 PROCEDURE — 97112 NEUROMUSCULAR REEDUCATION: CPT | Mod: GP | Performed by: PHYSICAL THERAPIST

## 2019-09-23 ENCOUNTER — THERAPY VISIT (OUTPATIENT)
Dept: PHYSICAL THERAPY | Facility: CLINIC | Age: 49
End: 2019-09-23
Payer: COMMERCIAL

## 2019-09-23 DIAGNOSIS — R26.0 GAIT, BROAD-BASED: ICD-10-CM

## 2019-09-23 DIAGNOSIS — M25.572 ACUTE LEFT ANKLE PAIN: ICD-10-CM

## 2019-09-23 PROCEDURE — 97116 GAIT TRAINING THERAPY: CPT | Mod: GP | Performed by: PHYSICAL THERAPIST

## 2019-09-23 PROCEDURE — 97110 THERAPEUTIC EXERCISES: CPT | Mod: GP | Performed by: PHYSICAL THERAPIST

## 2019-09-30 ENCOUNTER — THERAPY VISIT (OUTPATIENT)
Dept: PHYSICAL THERAPY | Facility: CLINIC | Age: 49
End: 2019-09-30
Payer: COMMERCIAL

## 2019-09-30 DIAGNOSIS — M25.572 ACUTE LEFT ANKLE PAIN: ICD-10-CM

## 2019-09-30 DIAGNOSIS — R26.0 GAIT, BROAD-BASED: ICD-10-CM

## 2019-09-30 PROCEDURE — 97116 GAIT TRAINING THERAPY: CPT | Mod: GP | Performed by: PHYSICAL THERAPIST

## 2019-09-30 PROCEDURE — 97110 THERAPEUTIC EXERCISES: CPT | Mod: GP | Performed by: PHYSICAL THERAPIST

## 2019-10-02 ENCOUNTER — OFFICE VISIT (OUTPATIENT)
Dept: PODIATRY | Facility: CLINIC | Age: 49
End: 2019-10-02
Payer: COMMERCIAL

## 2019-10-02 ENCOUNTER — ANCILLARY PROCEDURE (OUTPATIENT)
Dept: GENERAL RADIOLOGY | Facility: CLINIC | Age: 49
End: 2019-10-02
Attending: PODIATRIST
Payer: COMMERCIAL

## 2019-10-02 VITALS
BODY MASS INDEX: 43.32 KG/M2 | SYSTOLIC BLOOD PRESSURE: 138 MMHG | DIASTOLIC BLOOD PRESSURE: 82 MMHG | WEIGHT: 260 LBS | HEIGHT: 65 IN

## 2019-10-02 DIAGNOSIS — Z98.890 POST-OPERATIVE STATE: ICD-10-CM

## 2019-10-02 DIAGNOSIS — Z98.890 POST-OPERATIVE STATE: Primary | ICD-10-CM

## 2019-10-02 PROCEDURE — 73630 X-RAY EXAM OF FOOT: CPT | Mod: LT

## 2019-10-02 PROCEDURE — 99024 POSTOP FOLLOW-UP VISIT: CPT | Performed by: PODIATRIST

## 2019-10-02 ASSESSMENT — MIFFLIN-ST. JEOR: SCORE: 1807.29

## 2019-10-02 NOTE — PATIENT INSTRUCTIONS
1 month follow up      Thank you for choosing [facility]!    DR. GOMEZ'S CLINIC SCHEDULE  MONDAY AM - [place] TUESDAY - [place]   [address] [address] [place] [address] [address] [place] [address]   [phone] / [phone] / [phone]       WEDNESDAY - [place] FRIDAY AM - WOUND CENTER   [address]   [place] [address] [place] [address]   [phone] / [phone]       FRIDAY PM - [place] SCHEDULE SURGERY: [phone] [address] BILLING QUESTIONS: [phone]   [place] [address] AFTER HOURS: [phone]   [phone] / [phone] APPOINTMENTS: [phone]     Consumer Price Line (CPL) [phone]       SCAR CARE PROTOCOL  Scarring is an unfortunate but unavoidable part of surgery.  Every person scars differently and there is no way to predict how an individual's final scar will look.  Now that the sutures have been removed one can begin taking some steps to help minimize the appearance of scarring.    WOUND HEALING  As soon as the skin is incised during surgery, the body is taking steps to prepare for healing. After about 3 days, the body has sent cells to the incision to begin the healing process. These cells, called fibroblasts, make collagen, a protein in the skin that helps provide strength. Once the skin has been sufficiently strengthened, the sutures are removed. Over the next year, the body synthesizes new collagen and breaks down old collagen to help achieve a strong scar that allows the foot/ankle to function appropriately. This is where patients can help the appearance of the scar, as it will change over the next year.    STEPS  1. Do not expose the scar to the sun for 1 year.  2. Any sun exposure may permanently darken the appearance of the scar.  3. Wear shoes/socks or cover your scar with zinc oxide.  4. Massage the scar 2-3 times per day.  -Massage  the entire length of the scar with gentle to moderate pressure.  -Pressure can help flatten the scar.  5. Lotion/Vitamin E helps keep the tissue soft.  6. Try over the counter scar products such as Mederma or Scar Zone.  -These are available at any pharmacy without a prescription.  -Patients must use these for extended periods of time (6-12 months) to see a difference.      BODY WEIGHT AND YOUR FEET  The following information is included in the after visit summary for all patients. Body weight can be a sensitive issue to discuss in clinic, but we think the following information is very important. Although we focus on the feet and ankles, we do support the overall health of our patients.     Many things can cause foot and ankle problems. Foot structure, activity level, foot mechanics and injuries are common causes of pain. One very important issue that often goes unmentioned, is body weight. Extra weight can cause increased stress on muscles, ligaments, bones and tendons. Sometimes just a few extra pounds is all it takes to put one over her/his threshold. Without reducing that stress, it can be difficult to alleviate pain. As Foot & Ankle specialists, our job is addressing the lower extremity problem and possible causes. Regarding extra body weight, we encourage patients to discuss diet and weight management plans with their primary care doctors. It is this team approach that gives you the best opportunity for pain relief and getting you back on your feet.      [facility] has a Comprehensive Weight Management Program. This program includes counseling, education, non-surgical and surgical approaches to weight loss. If you are interested in learning more either talk to you primary care provider or call [phone].

## 2019-10-02 NOTE — PROGRESS NOTES
"Podiatry / Foot and Ankle Surgery Progress Note    October 2, 2019    Subject: Patient was seen for s/p left bone spur removal and 2nd metatarsal cuneiform joint fusion. Notes she is doing well. Some throbbing but has been able to walk in a shoe a little bit.     Objective:  Vitals: /82 (BP Location: Right arm, Patient Position: Chair, Cuff Size: Adult Large)   Ht 1.638 m (5' 4.5\")   Wt 117.9 kg (260 lb)   BMI 43.94 kg/m    BMI= Body mass index is 43.94 kg/m .    General:  Patient is alert and orientated.  NAD  Vascular:  DP and PT pulses are palpable.  No varicosities noted but minimal edema to left foot.  CFT's < 3secs.  Skin temp is normal  Neuro:  Light and gross touch sensation intact to digits, dorsum, and plantar aspects of the feet.  Derm:  Incision is well healed. Some remaining scabs over area.   Musculoskeletal:  No foot deformity noted.      Imaging: left foot xrays - I have looked at and reviewed xrays personally. Hardware in good position. Some bony consolidation across fusion site.     Assessment: Post-operative state    Plan:  Continue to progress into shoes. Continue PT. Follow up in 1 month. Talked about compression socks but she notes she has them.     Bia Medellin DPM, Podiatry/Foot and Ankle Surgery    Weight management plan: Patient was referred to their PCP to discuss a diet and exercise plan.    Recommended to Claudia Heaton to follow up with Primary Care provider regarding elevated blood pressure.    "

## 2019-10-02 NOTE — LETTER
"    10/2/2019         RE: Claudia Heaton  [address]  [facility] [address]        Dear Colleague,    Thank you for referring your patient, Claudia Heaton, to the [facility]. Please see a copy of my visit note below.    Podiatry / Foot and Ankle Surgery Progress Note    October 2, 2019    Subject: Patient was seen for s/p left bone spur removal and 2nd metatarsal cuneiform joint fusion. Notes she is doing well. Some throbbing but has been able to walk in a shoe a little bit.     Objective:  Vitals: /82 (BP Location: Right arm, Patient Position: Chair, Cuff Size: Adult Large)   Ht 1.638 m (5' 4.5\")   Wt 117.9 kg (260 lb)   BMI 43.94 kg/m     BMI= Body mass index is 43.94 kg/m .    General:  Patient is alert and orientated.  NAD  Vascular:  DP and PT pulses are palpable.  No varicosities noted but minimal edema to left foot.  CFT's < 3secs.  Skin temp is normal  Neuro:  Light and gross touch sensation intact to digits, dorsum, and plantar aspects of the feet.  Derm:  Incision is well healed. Some remaining scabs over area.   Musculoskeletal:  No foot deformity noted.      Imaging: left foot xrays - I have looked at and reviewed xrays personally. Hardware in good position. Some bony consolidation across fusion site.     Assessment: Post-operative state    Plan:  Continue to progress into shoes. Continue PT. Follow up in 1 month. Talked about compression socks but she notes she has them.     Bia Medellin DPM, Podiatry/Foot and Ankle Surgery    Weight management plan: Patient was referred to their PCP to discuss a diet and exercise plan.    Recommended to Claudia Heaton to follow up with Primary Care provider regarding elevated blood pressure.      Again, thank you for allowing me to participate in the care of your patient.        Sincerely,        Bia Medellin DPM, Podiatry/Foot and Ankle Surgery    "

## 2019-10-07 ENCOUNTER — THERAPY VISIT (OUTPATIENT)
Dept: PHYSICAL THERAPY | Facility: CLINIC | Age: 49
End: 2019-10-07
Payer: COMMERCIAL

## 2019-10-07 DIAGNOSIS — R26.0 GAIT, BROAD-BASED: ICD-10-CM

## 2019-10-07 DIAGNOSIS — M25.572 ACUTE LEFT ANKLE PAIN: ICD-10-CM

## 2019-10-07 PROCEDURE — 97110 THERAPEUTIC EXERCISES: CPT | Mod: GP | Performed by: PHYSICAL THERAPIST

## 2019-10-14 ENCOUNTER — THERAPY VISIT (OUTPATIENT)
Dept: PHYSICAL THERAPY | Facility: CLINIC | Age: 49
End: 2019-10-14
Payer: COMMERCIAL

## 2019-10-14 DIAGNOSIS — R26.0 GAIT, BROAD-BASED: ICD-10-CM

## 2019-10-14 DIAGNOSIS — M25.572 ACUTE LEFT ANKLE PAIN: ICD-10-CM

## 2019-10-14 PROCEDURE — 97530 THERAPEUTIC ACTIVITIES: CPT | Mod: GP | Performed by: PHYSICAL THERAPIST

## 2019-10-14 PROCEDURE — 97110 THERAPEUTIC EXERCISES: CPT | Mod: GP | Performed by: PHYSICAL THERAPIST

## 2019-10-21 ENCOUNTER — THERAPY VISIT (OUTPATIENT)
Dept: PHYSICAL THERAPY | Facility: CLINIC | Age: 49
End: 2019-10-21
Payer: COMMERCIAL

## 2019-10-21 DIAGNOSIS — R26.0 GAIT, BROAD-BASED: ICD-10-CM

## 2019-10-21 DIAGNOSIS — M25.572 ACUTE LEFT ANKLE PAIN: ICD-10-CM

## 2019-10-21 PROCEDURE — 97530 THERAPEUTIC ACTIVITIES: CPT | Mod: GP | Performed by: PHYSICAL THERAPIST

## 2019-10-21 PROCEDURE — 97112 NEUROMUSCULAR REEDUCATION: CPT | Mod: GP | Performed by: PHYSICAL THERAPIST

## 2019-10-21 PROCEDURE — 97110 THERAPEUTIC EXERCISES: CPT | Mod: GP | Performed by: PHYSICAL THERAPIST

## 2019-10-28 ENCOUNTER — THERAPY VISIT (OUTPATIENT)
Dept: PHYSICAL THERAPY | Facility: CLINIC | Age: 49
End: 2019-10-28
Payer: COMMERCIAL

## 2019-10-28 DIAGNOSIS — R26.0 GAIT, BROAD-BASED: ICD-10-CM

## 2019-10-28 DIAGNOSIS — M25.572 ACUTE LEFT ANKLE PAIN: ICD-10-CM

## 2019-10-28 PROCEDURE — 97140 MANUAL THERAPY 1/> REGIONS: CPT | Mod: GP | Performed by: PHYSICAL THERAPIST

## 2019-10-28 PROCEDURE — 97110 THERAPEUTIC EXERCISES: CPT | Mod: GP | Performed by: PHYSICAL THERAPIST

## 2019-11-04 ENCOUNTER — THERAPY VISIT (OUTPATIENT)
Dept: PHYSICAL THERAPY | Facility: CLINIC | Age: 49
End: 2019-11-04
Payer: COMMERCIAL

## 2019-11-04 DIAGNOSIS — M25.572 ACUTE LEFT ANKLE PAIN: ICD-10-CM

## 2019-11-04 DIAGNOSIS — R26.0 GAIT, BROAD-BASED: ICD-10-CM

## 2019-11-04 PROCEDURE — 97110 THERAPEUTIC EXERCISES: CPT | Mod: GP | Performed by: PHYSICAL THERAPIST

## 2019-11-04 PROCEDURE — 97530 THERAPEUTIC ACTIVITIES: CPT | Mod: GP | Performed by: PHYSICAL THERAPIST

## 2019-11-05 NOTE — PROGRESS NOTES
Subjective:  HPI                    Objective:  System    Physical Exam    General     ROS    Assessment/Plan:    PROGRESS  REPORT    Progress reporting period is from 9/9/2019 to 11/4/2019.       SUBJECTIVE  Subjective changes noted by patient:  Pt reports that she has been able to walk around the house without difficulty.  If she has to go into the office with her boot on, she gets increased swelling because she doesn't get a chance to stretch as much or elevate. The thing holding her back from going to work without her boot is feeling like she still needs to protect it.  She also has been going up stairs reciprocally, but goes backwards downstairs - the steps are very steep where she lives.    Changes in function:  Yes (See Goal flowsheet attached for changes in current functional level)  Adverse reaction to treatment or activity: None    OBJECTIVE  Changes noted in objective findings:  Yes, see below.  Objective: L ankle AROM DF 15, PF 60, Inv 60, Ev 18.  MMT DF 5/5, posterior tib 5/5, anterior tib 5/5, peroneal 5/5, ext digitorum 5/5.  Able to balance for 15 seconds at a time on L LE.     ASSESSMENT/PLAN  Updated problem list and treatment plan: Diagnosis 1:  S/p L midfoot fusion  Pain -  hot/cold therapy  Decreased ROM/flexibility - therapeutic exercise and home program  Decreased strength - therapeutic exercise, therapeutic activities and home program  Impaired balance - neuro re-education, therapeutic activities and home program  Decreased proprioception - neuro re-education, therapeutic activities and home program  Edema - cold therapy  Impaired gait - gait training  Decreased function - therapeutic activities and home program  STG/LTGs have been met or progress has been made towards goals:  Yes (See Goal flow sheet completed today.)  Assessment of Progress: The patient's condition is improving.  The patient's condition has potential to improve.  Self Management Plans:  Patient has been instructed in a home  treatment program.  I have re-evaluated this patient and find that the nature, scope, duration and intensity of the therapy is appropriate for the medical condition of the patient.  Claudia continues to require the following intervention to meet STG and LTG's:  PT    Recommendations:  This patient would benefit from continued therapy.     Frequency:  1 X week, once daily  Duration:  for 4-6 weeks        Please refer to the daily flowsheet for treatment today, total treatment time and time spent performing 1:1 timed codes.

## 2019-11-06 ENCOUNTER — OFFICE VISIT (OUTPATIENT)
Dept: PODIATRY | Facility: CLINIC | Age: 49
End: 2019-11-06
Payer: COMMERCIAL

## 2019-11-06 ENCOUNTER — ANCILLARY PROCEDURE (OUTPATIENT)
Dept: GENERAL RADIOLOGY | Facility: CLINIC | Age: 49
End: 2019-11-06
Attending: PODIATRIST
Payer: COMMERCIAL

## 2019-11-06 VITALS
SYSTOLIC BLOOD PRESSURE: 132 MMHG | HEIGHT: 65 IN | WEIGHT: 260 LBS | DIASTOLIC BLOOD PRESSURE: 78 MMHG | BODY MASS INDEX: 43.32 KG/M2

## 2019-11-06 DIAGNOSIS — Z98.890 POST-OPERATIVE STATE: Primary | ICD-10-CM

## 2019-11-06 DIAGNOSIS — Z98.890 POST-OPERATIVE STATE: ICD-10-CM

## 2019-11-06 PROCEDURE — 99212 OFFICE O/P EST SF 10 MIN: CPT | Performed by: PODIATRIST

## 2019-11-06 PROCEDURE — 73630 X-RAY EXAM OF FOOT: CPT | Mod: LT

## 2019-11-06 ASSESSMENT — MIFFLIN-ST. JEOR: SCORE: 1807.29

## 2019-11-06 NOTE — LETTER
"    11/6/2019         RE: Claudia Heaton  [address]  [facility] [address]        Dear Colleague,    Thank you for referring your patient, Claudia Heaton, to the [facility]. Please see a copy of my visit note below.    Podiatry / Foot and Ankle Surgery Progress Note    November 6, 2019    Subject: Patient was seen for follow up on 14 weeks s/p left foot surgery. Notes she has been wearing shoes around the house. Doing well. Minimal aching but has some swelling still. No concerns.     Objective:  Vitals: Ht 1.638 m (5' 4.5\")   Wt 117.9 kg (260 lb)   BMI 43.94 kg/m     BMI= Body mass index is 43.94 kg/m .    General:  Patient is alert and orientated.  NAD  Vascular:  DP and PT pulses are palpable.  No varicosities noted but minimal edema to left foot.  CFT's < 3secs.  Skin temp is normal  Neuro:  Light and gross touch sensation intact to digits, dorsum, and plantar aspects of the feet.  Derm:  Incision is well healed. Some remaining scabs over area.   Musculoskeletal:  No foot deformity noted.   muscle strength is 5/5.      Imaging: left foot xrays - I have looked at and reviewed xrays personally. Hardware in good position. Some bony consolidation across fusion site.      Assessment: Post-operative state     Plan:  Continue to progress into shoes. She will follow up as needed. Was told to call with questions or concerns.        Bia Medellin DPM, Podiatry/Foot and Ankle Surgery    Weight management plan: Patient was referred to their PCP to discuss a diet and exercise plan.    Recommended to Claudia Heaton to follow up with Primary Care provider regarding elevated blood pressure.      Again, thank you for allowing me to participate in the care of your patient.        Sincerely,        Bia Medellin DPM, Podiatry/Foot and Ankle Surgery    "

## 2019-11-06 NOTE — PROGRESS NOTES
"Podiatry / Foot and Ankle Surgery Progress Note    November 6, 2019    Subject: Patient was seen for follow up on 14 weeks s/p left foot surgery. Notes she has been wearing shoes around the house. Doing well. Minimal aching but has some swelling still. No concerns.     Objective:  Vitals: Ht 1.638 m (5' 4.5\")   Wt 117.9 kg (260 lb)   BMI 43.94 kg/m    BMI= Body mass index is 43.94 kg/m .    General:  Patient is alert and orientated.  NAD  Vascular:  DP and PT pulses are palpable.  No varicosities noted but minimal edema to left foot.  CFT's < 3secs.  Skin temp is normal  Neuro:  Light and gross touch sensation intact to digits, dorsum, and plantar aspects of the feet.  Derm:  Incision is well healed. Some remaining scabs over area.   Musculoskeletal:  No foot deformity noted.  muscle strength is 5/5.      Imaging: left foot xrays - I have looked at and reviewed xrays personally. Hardware in good position. Some bony consolidation across fusion site.      Assessment: Post-operative state     Plan:  Continue to progress into shoes. She will follow up as needed. Was told to call with questions or concerns.        Bia Medellin DPM, Podiatry/Foot and Ankle Surgery    Weight management plan: Patient was referred to their PCP to discuss a diet and exercise plan.    Recommended to Claudia Heaton to follow up with Primary Care provider regarding elevated blood pressure.    "

## 2019-11-06 NOTE — PATIENT INSTRUCTIONS
Thank you for choosing [facility]!    DR. GOMEZ'S CLINIC SCHEDULE  MONDAY AM - [place] TUESDAY - [place]   [address] [address] [address] [address] [address] [place] [address]   [phone] / [phone] / [phone]       WEDNESDAY - [place] FRIDAY AM - WOUND CENTER   [address]   [place] [address] [place] [address]   [phone] / [phone]       FRIDAY PM - [place] SCHEDULE SURGERY: [phone]   [address] BILLING QUESTIONS: [phone]   [place] [address] AFTER HOURS: [phone]   [phone] / [phone] APPOINTMENTS: [phone]     Consumer Price Line (CPL) [phone]     Follow up as needed      BODY WEIGHT AND YOUR FEET  The following information is included in the after visit summary for all patients. Body weight can be a sensitive issue to discuss in clinic, but we think the following information is very important. Although we focus on the feet and ankles, we do support the overall health of our patients.     Many things can cause foot and ankle problems. Foot structure, activity level, foot mechanics and injuries are common causes of pain. One very important issue that often goes unmentioned, is body weight. Extra weight can cause increased stress on muscles, ligaments, bones and tendons. Sometimes just a few extra pounds is all it takes to put one over her/his threshold. Without reducing that stress, it can be difficult to alleviate pain. As Foot & Ankle specialists, our job is addressing the lower extremity problem and possible causes. Regarding extra body weight, we encourage patients to discuss diet and weight management plans with their primary care doctors. It is this team approach that gives you the best opportunity for pain relief and getting you back on your feet.      [facility] has a Comprehensive Weight Management Program. This program includes  counseling, education, non-surgical and surgical approaches to weight loss. If you are interested in learning more either talk to you primary care provider or call [phone].

## 2019-11-18 ENCOUNTER — THERAPY VISIT (OUTPATIENT)
Dept: PHYSICAL THERAPY | Facility: CLINIC | Age: 49
End: 2019-11-18
Payer: COMMERCIAL

## 2019-11-18 DIAGNOSIS — R26.0 GAIT, BROAD-BASED: ICD-10-CM

## 2019-11-18 DIAGNOSIS — M25.572 ACUTE LEFT ANKLE PAIN: ICD-10-CM

## 2019-11-18 PROCEDURE — 97112 NEUROMUSCULAR REEDUCATION: CPT | Mod: GP | Performed by: PHYSICAL THERAPIST

## 2019-11-18 PROCEDURE — 97110 THERAPEUTIC EXERCISES: CPT | Mod: GP | Performed by: PHYSICAL THERAPIST

## 2019-11-25 ENCOUNTER — THERAPY VISIT (OUTPATIENT)
Dept: PHYSICAL THERAPY | Facility: CLINIC | Age: 49
End: 2019-11-25
Payer: COMMERCIAL

## 2019-11-25 DIAGNOSIS — R26.0 GAIT, BROAD-BASED: ICD-10-CM

## 2019-11-25 DIAGNOSIS — M25.572 ACUTE LEFT ANKLE PAIN: ICD-10-CM

## 2019-11-25 PROCEDURE — 97112 NEUROMUSCULAR REEDUCATION: CPT | Mod: GP | Performed by: PHYSICAL THERAPIST

## 2019-11-25 PROCEDURE — 97110 THERAPEUTIC EXERCISES: CPT | Mod: GP | Performed by: PHYSICAL THERAPIST

## 2019-11-25 NOTE — PROGRESS NOTES
"Subjective:  HPI                    Objective:  System    Physical Exam    General     ROS    Assessment/Plan:    PROGRESS  REPORT    Progress reporting period is from 11/4/2019 to 11/25/2019.       SUBJECTIVE  Subjective changes noted by patient:   Pt reports that she went to [employer] last Saturday and she's going today and has signed up for 2 intro classes..  She doesn't have boots that she can fit into right now so she doesn't feel comfortable going to work without wearing her CAM boot.  She doesn't have pain, just swelling. So it's uncomfortable because her foot rubs against her shoe/boots.  She reports that she doesn't have any other functional activities that are difficult.  Stairs are getting easier - she doesn't have any stairs at home to go up/down.  She feels ready to try to progress strengthening on her own with Pilates at this point.  Changes in function:  Yes (See Goal flowsheet attached for changes in current functional level)  Adverse reaction to treatment or activity: None    OBJECTIVE  Changes noted in objective findings:  Yes, see below.  Objective: SLS on L LE for 20\", MMT L ankle PF 3-/5, all other ankle muscle groups 5/5.   L ankle AROM DF 15, PF 60, Inv 35, Ev 12.     ASSESSMENT/PLAN  Updated problem list and treatment plan: Diagnosis 1:  S/p L ankle surgery to remove bone spurs  Decreased strength - therapeutic exercise, therapeutic activities and home program  Impaired balance - neuro re-education, therapeutic activities and home program  Decreased proprioception - neuro re-education, therapeutic activities and home program  Edema - cold therapy  Impaired muscle performance - neuro re-education and home program  Decreased function - therapeutic activities and home program  STG/LTGs have been met or progress has been made towards goals:  Yes (See Goal flow sheet completed today.)  Assessment of Progress: The patient's condition is improving.  The patient's condition has potential to " improve.  Self Management Plans:  Patient is independent in a home treatment program.  I have re-evaluated this patient and find that the nature, scope, duration and intensity of the therapy is appropriate for the medical condition of the patient.  Claudia continues to require the following intervention to meet STG and LTG's:  Pt may not need any additional PT if able to progress with Pilates    Recommendations:  Pt will continue with HEP and Pilates to progress function/strength and return to PT only if she has setbacks or feels that she isn't progressing.    Please refer to the daily flowsheet for treatment today, total treatment time and time spent performing 1:1 timed codes.

## 2020-01-22 PROBLEM — M25.572 ACUTE LEFT ANKLE PAIN: Status: RESOLVED | Noted: 2019-09-09 | Resolved: 2020-01-22

## 2020-01-22 PROBLEM — R26.0 GAIT, BROAD-BASED: Status: RESOLVED | Noted: 2019-09-23 | Resolved: 2020-01-22

## (undated) DEVICE — DRILL BIT ARTHREX LPS CAN 2.0MM AR-8933-20C

## (undated) DEVICE — SOL NACL 0.9% IRRIG 1000ML BOTTLE 07138-09

## (undated) DEVICE — PREP POVIDONE IODINE SOLUTION 10% 4OZ

## (undated) DEVICE — LINEN HALF SHEET 5512

## (undated) DEVICE — GLOVE PROTEXIS BLUE W/NEU-THERA 6.5  2D73EB65

## (undated) DEVICE — PREP SKIN SCRUB TRAY 4461A

## (undated) DEVICE — VESSEL LOOPS RED MINI 31145710

## (undated) DEVICE — DRAPE C-ARM MINI 5423

## (undated) DEVICE — SU VICRYL 4-0 PS-2 18" UND J496H

## (undated) DEVICE — DRSG KERLIX 4 1/2"X4YDS ROLL 6730

## (undated) DEVICE — LINEN TOWEL PACK X10 5473

## (undated) DEVICE — BLADE SAW SAGITTAL 25.5X9.5X.4MM FINE LINVATEC 5023-138

## (undated) DEVICE — SU VICRYL 3-0 PS-2 18" UND J497H

## (undated) DEVICE — PREP POVIDONE IODINE SCRUB 7.5% 4OZ APL82212

## (undated) DEVICE — GUIDEWIRE TROCAR TIP .045" W/LASER LINE AR-8737-04

## (undated) DEVICE — BAG CLEAR TRASH 1.3M 39X33" P4040C

## (undated) DEVICE — BLADE SAW MICRO SAGITTAL LINVATEC 9.5X41X0.4MM 5023-140

## (undated) DEVICE — SU PROLENE 4-0 PS-2 18" 8682G

## (undated) DEVICE — PACK LOWER EXTREMITY RIDGES

## (undated) DEVICE — NDL BLUNT 18GA 1" W/O FILTER 305181

## (undated) DEVICE — LINEN FULL SHEET 5511

## (undated) DEVICE — DRSG GAUZE 4X4" TRAY

## (undated) DEVICE — ESU GROUND PAD ADULT W/CORD E7507

## (undated) DEVICE — TOURNIQUET CUFF 30" REPRO BLUE 60-7070-105

## (undated) DEVICE — BNDG ELASTIC 4"X5YDS UNSTERILE 6611-40

## (undated) DEVICE — GLOVE PROTEXIS W/NEU-THERA 6.5  2D73TE65

## (undated) DEVICE — BLADE KNIFE SURG 15 371115

## (undated) DEVICE — DRAPE STERI TOWEL LG 1010

## (undated) RX ORDER — GLYCOPYRROLATE 0.2 MG/ML
INJECTION INTRAMUSCULAR; INTRAVENOUS
Status: DISPENSED
Start: 2019-07-25

## (undated) RX ORDER — BUPIVACAINE HYDROCHLORIDE 5 MG/ML
INJECTION, SOLUTION EPIDURAL; INTRACAUDAL
Status: DISPENSED
Start: 2019-07-25

## (undated) RX ORDER — PROPOFOL 10 MG/ML
INJECTION, EMULSION INTRAVENOUS
Status: DISPENSED
Start: 2019-07-25

## (undated) RX ORDER — EPHEDRINE SULFATE 50 MG/ML
INJECTION, SOLUTION INTRAMUSCULAR; INTRAVENOUS; SUBCUTANEOUS
Status: DISPENSED
Start: 2019-07-25

## (undated) RX ORDER — ONDANSETRON 2 MG/ML
INJECTION INTRAMUSCULAR; INTRAVENOUS
Status: DISPENSED
Start: 2019-07-25

## (undated) RX ORDER — DEXAMETHASONE SODIUM PHOSPHATE 4 MG/ML
INJECTION, SOLUTION INTRA-ARTICULAR; INTRALESIONAL; INTRAMUSCULAR; INTRAVENOUS; SOFT TISSUE
Status: DISPENSED
Start: 2019-07-25

## (undated) RX ORDER — FENTANYL CITRATE 50 UG/ML
INJECTION, SOLUTION INTRAMUSCULAR; INTRAVENOUS
Status: DISPENSED
Start: 2019-07-25

## (undated) RX ORDER — GINSENG 100 MG
CAPSULE ORAL
Status: DISPENSED
Start: 2019-07-25

## (undated) RX ORDER — NEOSTIGMINE METHYLSULFATE 1 MG/ML
VIAL (ML) INJECTION
Status: DISPENSED
Start: 2019-07-25

## (undated) RX ORDER — CEFAZOLIN SODIUM 2 G/100ML
INJECTION, SOLUTION INTRAVENOUS
Status: DISPENSED
Start: 2019-07-25

## (undated) RX ORDER — LIDOCAINE HYDROCHLORIDE 10 MG/ML
INJECTION, SOLUTION EPIDURAL; INFILTRATION; INTRACAUDAL; PERINEURAL
Status: DISPENSED
Start: 2019-07-25